# Patient Record
Sex: MALE | Race: WHITE | Employment: UNEMPLOYED
[De-identification: names, ages, dates, MRNs, and addresses within clinical notes are randomized per-mention and may not be internally consistent; named-entity substitution may affect disease eponyms.]

---

## 2020-08-21 ENCOUNTER — HOSPITAL ENCOUNTER (OUTPATIENT)
Age: 60
End: 2020-08-21
Attending: INTERNAL MEDICINE | Admitting: INTERNAL MEDICINE
Payer: COMMERCIAL

## 2020-08-21 ENCOUNTER — TRANSFERRED RECORDS (OUTPATIENT)
Dept: HEALTH INFORMATION MANAGEMENT | Facility: CLINIC | Age: 60
End: 2020-08-21

## 2020-08-21 NOTE — PROGRESS NOTES
[facility]  Transfer Triage Note    Date of call: 08/21/20  Time of call: 2:59 PM    Is pandemic COVID-19 a concern? NO    Reason for transfer: Procedure can be done here and not at referring hospital   Diagnosis: Pancreatic Pseudocyst    Outside Records: Available  Additional records requested to be faxed to [phone].    Stability of Patient: Patient is vitally stable, with no critical labs, and will likely remain stable throughout the transfer process  ICU: No    Expected Time of Arrival for Transfer: 8-24 hours    Arrival Location:  [address] [address] Phone: [phone]    Recommendations for Management and Stabilization: Not needed    Additional Comments   Refugio Rivera is a 59 year old male with a history of HTN was diagnosed with gall stone pancreatitis on 8/3 with subsequent cholecystectomy, re-admitted on 8/14 found to have pancreatic pseudocyst causing dyspepsia symptoms and DKA from new onset diabetes likely related to acute pancreatitis. His vitals are within normal limits and blood sugar is stable on sub cutaneous insulin. GI discussed this case and agreed that patient would likely need endoscopic intervention. Course has been complicated by adjustment disorder (started on fluoxetine) and DVT with bilateral pulmonary emboli thought to be from immobility and recent surgery. This was further complicated by bleeding gastric ulcer on lovenox, no s/p IVC filter placement. Will need enteric precautions given recent C-diff treatment completion.    John Atkins MD    Spoke with the resident, he is stable, no fever, no WBC, is not getting any feeding. Patient is on clear liquids and tolerating those ok.   Sugars are 140s. A1C 5.3%.  COVID test not done-will discuss timing with GI tomorrow.  Discussed on 8/24 with GI. Given tolerating clears would be safe to wait until schedule allows and patient able to have his  procedure covered.    Jane Nash MD    8/25  [place]  called and requested a call back by the patient's  here once assigned. Her name is Ayla. Phone is [phone].  Will touch base with team tomorrow morning (8/26) at latest.  Jane Nash MD    Addendum 8/26:  Patient doing well clinically. Our bed situation is quite tight, certainly not able to take today and remains withtou clear indication for more emergent intervention. They have been slowly advancing the diet, moving to full liquid today and trying to transition to oral pain medicines. Plan to touch base again tomorrow and will have conference call with GI, might be possible to consider outpatient follow-up w/in the next 1-2 weeks.     Yohannes Urias MD    Patient improving clinically and decided to go home and have the procedure as an outpatient here next week. Dr. Griffith's nurse Tameka arranging for procedure. Her office number is [phone]. Gave that number to the [facility] hospitalist who will pass it along to the patient in case he does not hear from her by Monday morning. They will also do a COVID swab before he leaves so he has one within 4 days of the procedure.    Jane Nash

## 2020-08-31 ENCOUNTER — PATIENT OUTREACH (OUTPATIENT)
Dept: GASTROENTEROLOGY | Facility: CLINIC | Age: 60
End: 2020-08-31

## 2020-08-31 NOTE — PROGRESS NOTES
Care Coordination New Patient Referral  [facility]    NP referral date: 08/28/20  Referred to MD: [facility]    Referring MD:   Referring contact information see initial referral note - none - this was direct call to Dr. Griffith   Referral for procedure    Diagnosis: necrotizing pancreatitis  Diabetes    Imaging:  Images available    CT  Location: [facility]  Date:  8/21/20    MRI   [facility]  Date:  7/28/20    Procedures:  Placement of IVC filter  Laparoscopic cholecystectomy 8//3/20    08/31/20 message left at home care to ask for assistance with planning procedure; attempted to reach patient, no answer.    Referral reviewed by Dr. Rodrigues; will be evaluated and managed by inpatient team.    Referral sent to [facility] for Dr. Griffith.      Meredith Lira  BSN, HNBC  RN Care Coordinator  [facility]  Ph: [phone]  Email: [email or web address]

## 2020-09-02 ENCOUNTER — PATIENT OUTREACH (OUTPATIENT)
Dept: GASTROENTEROLOGY | Facility: CLINIC | Age: 60
End: 2020-09-02

## 2020-09-03 ENCOUNTER — PATIENT OUTREACH (OUTPATIENT)
Dept: GASTROENTEROLOGY | Facility: CLINIC | Age: 60
End: 2020-09-03

## 2020-09-03 NOTE — TELEPHONE ENCOUNTER
Per intpatient team, pt should report to ER for triage of abd pain and work up for possible procedure, EUS. Pt not feeling well, understands plan, will come to [facility] today for further triage and care.    Tameka Julien, TYRONE Care Coordinator

## 2020-09-10 ENCOUNTER — PATIENT OUTREACH (OUTPATIENT)
Dept: GASTROENTEROLOGY | Facility: CLINIC | Age: 60
End: 2020-09-10

## 2020-09-10 NOTE — TELEPHONE ENCOUNTER
Lois called to inquire about patient f/up. Sounds like he's not following with clinic visits either.    Last we spoke to patient he committed to coming to [facility] for triage and assessment.     Left message for Lois of events leading to today, and that patient did not arrive at [facility] as agreed.     Called patient, left message asking that he call clinic to check in and tell us how he's doing.    Tameka Julien, RN Care Coordinator

## 2020-09-11 NOTE — TELEPHONE ENCOUNTER
"Received call from Dina at [facility] looking to assist in f/ up for patient. He has history of not following up, when asked why he didn't return phone calls he says \"he didn't feel  Up to it\". They're suggesting he's going to have his phone on today and will assist us in passing on messages for POC.     Pt was referred for EUS cystgastrostomy, and told to come to ER as he was not doing well, to assess, stabilize and provide procedure as needed. Pt was directed to come to ER, but did not on 9/4. ER remains our recommendation as time to procedure is several weeks out.     Left message for Mile ([phone]) of plan above. Called patient again, reviewed plan above, said would try to get to ER \"in the next few days\".    .Tameka Julien, RN Care Coordinator    "

## 2020-10-22 ENCOUNTER — TRANSFERRED RECORDS (OUTPATIENT)
Dept: HEALTH INFORMATION MANAGEMENT | Facility: CLINIC | Age: 60
End: 2020-10-22

## 2020-10-29 ENCOUNTER — TRANSCRIBE ORDERS (OUTPATIENT)
Dept: OTHER | Age: 60
End: 2020-10-29

## 2020-10-29 DIAGNOSIS — K86.3 PANCREATIC PSEUDOCYST: Primary | ICD-10-CM

## 2020-11-04 ENCOUNTER — DOCUMENTATION ONLY (OUTPATIENT)
Dept: CARE COORDINATION | Facility: CLINIC | Age: 60
End: 2020-11-04

## 2020-11-12 ENCOUNTER — HOSPITAL ENCOUNTER (OUTPATIENT)
Facility: CLINIC | Age: 60
End: 2020-11-12
Attending: INTERNAL MEDICINE | Admitting: INTERNAL MEDICINE
Payer: COMMERCIAL

## 2020-11-12 ENCOUNTER — PREP FOR PROCEDURE (OUTPATIENT)
Dept: GASTROENTEROLOGY | Facility: CLINIC | Age: 60
End: 2020-11-12

## 2020-11-12 ENCOUNTER — VIRTUAL VISIT (OUTPATIENT)
Dept: GASTROENTEROLOGY | Facility: CLINIC | Age: 60
End: 2020-11-12
Payer: COMMERCIAL

## 2020-11-12 VITALS — WEIGHT: 167 LBS | BODY MASS INDEX: 22.62 KG/M2 | HEIGHT: 72 IN

## 2020-11-12 DIAGNOSIS — K86.3 PANCREATIC PSEUDOCYST: Primary | ICD-10-CM

## 2020-11-12 DIAGNOSIS — K85.81 OTHER ACUTE PANCREATITIS WITH UNINFECTED NECROSIS: Primary | ICD-10-CM

## 2020-11-12 DIAGNOSIS — Z11.59 ENCOUNTER FOR SCREENING FOR OTHER VIRAL DISEASES: Primary | ICD-10-CM

## 2020-11-12 DIAGNOSIS — K86.3 PANCREATIC PSEUDOCYST: ICD-10-CM

## 2020-11-12 PROCEDURE — 99204 OFFICE O/P NEW MOD 45 MIN: CPT | Mod: GT | Performed by: INTERNAL MEDICINE

## 2020-11-12 RX ORDER — ALBUTEROL SULFATE 90 UG/1
2 AEROSOL, METERED RESPIRATORY (INHALATION)
COMMUNITY

## 2020-11-12 RX ORDER — BLOOD-GLUCOSE METER
EACH MISCELLANEOUS
COMMUNITY
Start: 2020-08-10 | End: 2021-08-10

## 2020-11-12 RX ORDER — BLOOD-GLUCOSE METER
KIT MISCELLANEOUS
COMMUNITY
Start: 2020-08-10 | End: 2021-08-10

## 2020-11-12 RX ORDER — AMLODIPINE BESYLATE 10 MG/1
10 TABLET ORAL
COMMUNITY
Start: 2020-10-12 | End: 2021-10-12

## 2020-11-12 RX ORDER — LISINOPRIL 40 MG/1
40 TABLET ORAL
COMMUNITY
Start: 2020-10-12 | End: 2021-10-12

## 2020-11-12 RX ORDER — PANTOPRAZOLE SODIUM 40 MG/1
40 TABLET, DELAYED RELEASE ORAL
COMMUNITY
Start: 2020-10-28 | End: 2021-10-28

## 2020-11-12 RX ORDER — FAMOTIDINE 20 MG/1
20 TABLET, FILM COATED ORAL
COMMUNITY
Start: 2020-10-28 | End: 2021-10-28

## 2020-11-12 RX ORDER — ATENOLOL 50 MG/1
50 TABLET ORAL
Status: ON HOLD | COMMUNITY
Start: 2020-08-29 | End: 2021-03-31

## 2020-11-12 ASSESSMENT — MIFFLIN-ST. JEOR: SCORE: 1610.51

## 2020-11-12 NOTE — PROGRESS NOTES
"Refugio Rivera is a 60 year old male who is being evaluated via a billable video visit.      The patient has been notified of following:     \"This video visit will be conducted via a call between you and your physician/provider. We have found that certain health care needs can be provided without the need for an in-person physical exam.  This service lets us provide the care you need with a video conversation.  If a prescription is necessary we can send it directly to your pharmacy.  If lab work is needed we can place an order for that and you can then stop by our lab to have the test done at a later time.    Video visits are billed at different rates depending on your insurance coverage.  Please reach out to your insurance provider with any questions.    If during the course of the call the physician/provider feels a video visit is not appropriate, you will not be charged for this service.\"    Patient has given verbal consent for Video visit? Yes  How would you like to obtain your AVS? Mail a copy  If you are dropped from the video visit, the video invite should be resent to: Text to cell phone: [phone]  Will anyone else be joining your video visit? No        Video-Visit Details    Type of service:  Video Visit        Guru Barbi MD      "

## 2020-11-12 NOTE — LETTER
"    11/12/2020         RE: Refugio Rivera  [address]  [address]  [place] [address]        Dear Colleague,    Thank you for referring your patient, Refugio Rivera, to the [facility]. Please see a copy of my visit note below.    Refugio Rivera is a 60 year old male who is being evaluated via a billable video visit.      The patient has been notified of following:     \"This video visit will be conducted via a call between you and your physician/provider. We have found that certain health care needs can be provided without the need for an in-person physical exam.  This service lets us provide the care you need with a video conversation.  If a prescription is necessary we can send it directly to your pharmacy.  If lab work is needed we can place an order for that and you can then stop by our lab to have the test done at a later time.    Video visits are billed at different rates depending on your insurance coverage.  Please reach out to your insurance provider with any questions.    If during the course of the call the physician/provider feels a video visit is not appropriate, you will not be charged for this service.\"    Patient has given verbal consent for Video visit? Yes  How would you like to obtain your AVS? Mail a copy  If you are dropped from the video visit, the video invite should be resent to: Text to cell phone: [phone]  Will anyone else be joining your video visit? No        Video-Visit Details    Type of service:  Video Visit        Guru Barbi MD        REFERRING PROVIDER:  Dr. Korina Batista.      CHIEF COMPLAINT:  Abdominal pain, failure to thrive, weight loss.      HISTORY OF PRESENT ILLNESS:  This is a 60-year-old white male with history of hypertension, hyperlipidemia, esophagitis with gastric ulcer who had been admitted at an outside hospital between 07/28/2020 and 08/30/2020 for acute pancreatitis, status post " cholecystectomy.  Hospital stay was complicated by aspiration pneumonia, diabetic ketoacidosis, C diff colitis.  The patient was referred here for followup for walled-off necrosis/pseudocyst. He was referred immediately after discharge, but this outpatient visit only materialized now. Currently, the patient reports intermittent abdominal pain, nausea and vomiting and he has lost about 40 pounds.  He has early satiety and is unable to eat because of the peripancreatic collection.  As part of GI workup, he has had an EGD, which showed esophagitis and a small gastric ulcer.  No obvious gastric masses were seen.  He has also had C. diff colitis for which he was managed on oral vancomycin 125 mg q.i.d.      PAST MEDICAL HISTORY:   1.  Polysubstance abuse.   2.  Alcohol abuse.   3.  Deep vein thrombosis, currently on Lovenox.   4.  Pulmonary embolism.   5.  Diabetic ketoacidosis.   6.  Hypertension.   7.  Alcohol abuse.   8.  Prolonged QT interval.   9.  Generalized anxiety disorder.   10.  Marijuana abuse.   11.  PTSD.   12.  Social anxiety disorder  13. C diff colitis  14. Depression  15 .Opiate abuse      PAST SURGICAL HISTORY:  Cholecystectomy with cholangiography on 08/03/2020. IVC filter placement in 8/2020     SOCIAL HISTORY:  Currently .  Former smoker, used to smoke 1-1/2 pack per day, quit smoking in 2004.  Smokes marijuana, drinks alcohol.      FAMILY HISTORY:  No history of pancreatitis.      ASSESSMENT AND PLAN:    This is a 60-year-old white male with history of acute pancreatitis for which he was hospitalized for nearly a month.  He underwent same admission cholecystectomy.  His hospital stay has been complicated by aspiration pneumonia, C. diff colitis and DKA.  He is being evaluated for a large walled-off necrosis/pseudocyst, last CT 10/2020.  Current symptoms include intermittent abdominal pain, weight loss of 40 pounds, persistent unwellness and failure to thrive.  The following are our  recommendations:   1.  Will recommend EUS-guided cystogastrostomy for endoscopic transluminal drainage.  This is performed under fluoroscopy.  We discussed in detail the risk of the procedure including risk of anesthesia, bleeding, risk of infection and perforation for which surgery would be required.   2.  The patient would need to hold off Lovenox the day before procedure.  The patient has an IVC filter.  He has recently had PEs and DVTs.  However, there is a risk of increased bleeding in the setting of Lovenox for EUS cystogastrostomy.   3.  He needs preop clearance, will need COVID testing prior to the procedure.   4.  Will need to check his HbA1C as a part of the preop labs.      A total of 25 minutes was spent, with more than 50% of the time discussing the risks of the procedure.     Sincerely,        Guru Barbi MD

## 2020-11-12 NOTE — PROGRESS NOTES
REFERRING PROVIDER:  Dr. Korina Batista.      CHIEF COMPLAINT:  Abdominal pain, failure to thrive, weight loss.      HISTORY OF PRESENT ILLNESS:  This is a 60-year-old white male with history of hypertension, hyperlipidemia, esophagitis with gastric ulcer who had been admitted at an outside hospital between 07/28/2020 and 08/30/2020 for acute pancreatitis, status post cholecystectomy.  Hospital stay was complicated by aspiration pneumonia, diabetic ketoacidosis, C diff colitis.  The patient was referred here for followup for walled-off necrosis/pseudocyst. He was referred immediately after discharge, but this outpatient visit only materialized now. Currently, the patient reports intermittent abdominal pain, nausea and vomiting and he has lost about 40 pounds.  He has early satiety and is unable to eat because of the peripancreatic collection.  As part of GI workup, he has had an EGD, which showed esophagitis and a small gastric ulcer.  No obvious gastric masses were seen.  He has also had C. diff colitis for which he was managed on oral vancomycin 125 mg q.i.d.      PAST MEDICAL HISTORY:   1.  Polysubstance abuse.   2.  Alcohol abuse.   3.  Deep vein thrombosis, currently on Lovenox.   4.  Pulmonary embolism.   5.  Diabetic ketoacidosis.   6.  Hypertension.   7.  Alcohol abuse.   8.  Prolonged QT interval.   9.  Generalized anxiety disorder.   10.  Marijuana abuse.   11.  PTSD.   12.  Social anxiety disorder  13. C diff colitis  14. Depression  15 .Opiate abuse      PAST SURGICAL HISTORY:  Cholecystectomy with cholangiography on 08/03/2020. IVC filter placement in 8/2020     SOCIAL HISTORY:  Currently .  Former smoker, used to smoke 1-1/2 pack per day, quit smoking in 2004.  Smokes marijuana, drinks alcohol.      FAMILY HISTORY:  No history of pancreatitis.      ASSESSMENT AND PLAN:    This is a 60-year-old white male with history of acute pancreatitis for which he was hospitalized for nearly a month.   He underwent same admission cholecystectomy.  His hospital stay has been complicated by aspiration pneumonia, C. diff colitis and DKA.  He is being evaluated for a large walled-off necrosis/pseudocyst, last CT 10/2020.  Current symptoms include intermittent abdominal pain, weight loss of 40 pounds, persistent unwellness and failure to thrive.  The following are our recommendations:   1.  Will recommend EUS-guided cystogastrostomy for endoscopic transluminal drainage.  This is performed under fluoroscopy.  We discussed in detail the risk of the procedure including risk of anesthesia, bleeding, risk of infection and perforation for which surgery would be required.   2.  The patient would need to hold off Lovenox the day before procedure.  The patient has an IVC filter.  He has recently had PEs and DVTs.  However, there is a risk of increased bleeding in the setting of Lovenox for EUS cystogastrostomy.   3.  He needs preop clearance, will need COVID testing prior to the procedure.   4.  Will need to check his HbA1C as a part of the preop labs.      A total of 25 minutes was spent, with more than 50% of the time discussing the risks of the procedure.

## 2020-11-13 ENCOUNTER — PATIENT OUTREACH (OUTPATIENT)
Dept: GASTROENTEROLOGY | Facility: CLINIC | Age: 60
End: 2020-11-13

## 2020-11-13 RX ORDER — LIDOCAINE 40 MG/G
CREAM TOPICAL
Status: CANCELLED | OUTPATIENT
Start: 2020-11-13

## 2020-11-13 NOTE — TELEPHONE ENCOUNTER
Per Dr. Landon   He needs preop clearance, will need COVID testing prior to the procedure.     Patient explains that transportation could be a problem, Currently scheduled for procedure on 11/18. Reviewed need for preop and covid test.  Will call patient back Monday to discuss further.    ML

## 2020-11-16 NOTE — TELEPHONE ENCOUNTER
Pt cannot come down for procedure    Is on lovenox now for DVT, advised has to hold 24 hours prior to procedure. Knows he needs preop and covid test prior. Unsure when he'll have a ride for procedure. Pt will call schedule when he's ready to reschedule.     Tameka Julien RN Care Coordinator

## 2020-11-16 NOTE — OR NURSING
Tameka Julien RN Callinan, Mary K, RN; Guru Markie Landon MD; P Pas ; Mikayla Joseph all-     PT cannot come as scheduled and we will be canceling procedure for 11/18     Mikayla, he will call you when he wants to reschedule     Thanks-

## 2020-12-01 ENCOUNTER — PATIENT OUTREACH (OUTPATIENT)
Dept: GASTROENTEROLOGY | Facility: CLINIC | Age: 60
End: 2020-12-01

## 2020-12-01 NOTE — LETTER
December 1, 2020    Refugio GORDON Clothier                              [address] [facility]    [place] [address]    Dear Refugio,    You recently met with Dr. Landon and discussed scheduling an outpatient procedure. It appears this has not been scheduled yet.    If you would like to schedule this outpatient procedure, please contact our scheduling office at [phone]. Please remember that you will need a preop physical within 30 days of your procedure AND a covid test no more than 4 day before procedure date.     Your healthcare is important to us. Please call us with any questions or concerns.     Tameka Julien, TYRONE Care Coordinator  Dr. Carroll, Dr. Griffith & Dr. Landon  [facility]  [phone]

## 2020-12-01 NOTE — TELEPHONE ENCOUNTER
Per Dr. Landon called patient to see if he will plan to reschedule recommended procedure. Left message. Letter sent.     ML

## 2021-01-10 ENCOUNTER — HEALTH MAINTENANCE LETTER (OUTPATIENT)
Age: 61
End: 2021-01-10

## 2021-01-19 ENCOUNTER — TRANSFERRED RECORDS (OUTPATIENT)
Dept: HEALTH INFORMATION MANAGEMENT | Facility: CLINIC | Age: 61
End: 2021-01-19

## 2021-01-19 ENCOUNTER — MEDICAL CORRESPONDENCE (OUTPATIENT)
Dept: HEALTH INFORMATION MANAGEMENT | Facility: CLINIC | Age: 61
End: 2021-01-19

## 2021-01-28 ENCOUNTER — PATIENT OUTREACH (OUTPATIENT)
Dept: GASTROENTEROLOGY | Facility: CLINIC | Age: 61
End: 2021-01-28

## 2021-01-28 NOTE — LETTER
January 28, 2021    Refugio GORDON Clothier                              [address]     [place] [address]    Dear Refugio,      Our office recently received a referral from Dr. Batista related to your healthcare. You met with Dr. Landon in November 2020 related to your for walled-off pancraetic necrosis/pseudocyst    We've talked several times about coming to our facility for a procedure, but you have not scheduled a date or any additional follow up.  If you wish to follow up on this referral please contact our office at [phone].     Tameka Julien RN Care Coordinator

## 2021-01-28 NOTE — TELEPHONE ENCOUNTER
[facility] received referral for patient related to panc psyeudocyst.     Korina Batista MD - 01/19/2021 8:45 AM CST      Diabetes with history of pancreatitis and pancreatic pseudocyst  Last hemoglobin A1c was 6.5% on 10/12. Patient is required multiple hospitalizations in the past for DKA. Previously had poor control at home and was not using his insulin, was transitioned to Metformin in October 2020. Patient was meant to transfer to the [facility] for drainage of his pseudocyst last summer, however he decided not to, and was lost for follow-up to GI. Continues to have some dyspepsia related to this.  -Have placed GI referral for follow-up for pancreatic pseudocyst  -Will obtain hemoglobin A1c today  -Continue Metformin 1000 mg twice daily at this time    Patient has been offered procedure twice, related to 2 separate previous referrals,  and has failed to arrange a date for procedure. We've organized virtual clinic visit and made multiple attempts for patient to come for procedure, but he has not followed up.     Called patient again to see if he is interested in follow up with us at this time. Rides have been a barrier to treatment previously.     Left message, letter sent.    ML

## 2021-03-01 ENCOUNTER — PATIENT OUTREACH (OUTPATIENT)
Dept: GASTROENTEROLOGY | Facility: CLINIC | Age: 61
End: 2021-03-01

## 2021-03-01 DIAGNOSIS — K86.3 PANCREATIC PSEUDOCYST: Primary | ICD-10-CM

## 2021-03-01 NOTE — TELEPHONE ENCOUNTER
Pt called stating he wants to schedule a time to come for procedure for EUS w/ . Previous attempts have been made to get patient to procedure but he has not followed up. Records updated via [facility]. Last imaging on file October, 2020. Per patient he's tired of being  Sick having trouble eating, trouble going to the bathroom. No fevers.    Will check with Dr. Landon regarding follow up.    ML

## 2021-03-02 NOTE — TELEPHONE ENCOUNTER
Per Dr. Landon        He was recommended to have EUS guided cystgastrostomy. Will need CT scan with IV contrast of abdomen and pelvis      Called to review above with patient.     Pt wants to do imaging locally. Orders placed. Will fax to [facility]. Reviewed with patient that he may need to come to [facility] for several procedure or follow up imaging, that he's required to have a  and chaperone for 24 hours post procedure and that we recommend staying 45 minutes from hospital post procedure for 24 hours.     Will fax images.  ML

## 2021-03-03 ENCOUNTER — DOCUMENTATION ONLY (OUTPATIENT)
Dept: GASTROENTEROLOGY | Facility: CLINIC | Age: 61
End: 2021-03-03

## 2021-03-12 ENCOUNTER — PATIENT OUTREACH (OUTPATIENT)
Dept: GASTROENTEROLOGY | Facility: CLINIC | Age: 61
End: 2021-03-12

## 2021-03-12 ENCOUNTER — PREP FOR PROCEDURE (OUTPATIENT)
Dept: GASTROENTEROLOGY | Facility: CLINIC | Age: 61
End: 2021-03-12

## 2021-03-12 DIAGNOSIS — K86.89 FLUID COLLECTION OF PANCREAS: ICD-10-CM

## 2021-03-12 DIAGNOSIS — K85.91 NECROTIZING PANCREATITIS: Primary | ICD-10-CM

## 2021-03-12 NOTE — TELEPHONE ENCOUNTER
As discussed, patient got CT locally to eval pancreatic fluid collection. Per Dr. Landon    Please call patient. If he continues to be in pain and is committed, I am willing to offer EUS under fluroscopy for endoscopic transluminal drainage     Called to discuss with patient, discussed procedure. Stressed patient needs to make sure he can come for procedure before we schedule  Date, reviewed need for preop and covid test, ride and 24 hour guardian after procedure.    Orders placed, he will call back with date that works for him/his ride.     Please assist in scheduling:     Procedure/Imaging/Clinic: EUS  Physician: Dr. Landon  Timing: 3/31/21  Procedure length:60 min  Anesthesia:general  Dx: panc fluid collection  Tier:2  Location: [facility]       Called to discuss with patient. Explained they can expect a call for date and time for procedure, will need a , someone to stay with them for 24 hours and should stay in town for 24 hours (within 45 min of Hospital) post procedure    Patient needs to get pre-op physical completed and will fax a copy to us along with bringing a hard copy with them. Fax number given. [phone] *If you do not get a preop physical, your procedure could be cancelled, patient voiced understanding*    Preop Plan:will do locally     Med Review    Blood thinner -  no  ASA - no  Diabetic - no    COVID test discussed:knows to get 3-4 days prior    Patient Education r/t procedure:done    Does patient have any history of gastric bypass/gastric surgery/altered panc/bili anatomy?no    A pre-op nurse will call 1-2 days prior to the procedure. Is advised to be NPO/no solid food 8 hours before the procedure. Ok to drink clear liquids (Water, Apple Juice or Gatorade) up to 2 hours prior to procedure.     Other specific details/comments:no     Verbalized understanding of all instructions. All questions answered.

## 2021-03-15 PROBLEM — K86.89 FLUID COLLECTION OF PANCREAS: Status: ACTIVE | Noted: 2021-03-15

## 2021-03-24 ENCOUNTER — PATIENT OUTREACH (OUTPATIENT)
Dept: GASTROENTEROLOGY | Facility: CLINIC | Age: 61
End: 2021-03-24

## 2021-03-24 NOTE — TELEPHONE ENCOUNTER
Per Refugio, need orders to sent to [facility]. Will fax to   [address] [address] [phone] ml

## 2021-03-25 ENCOUNTER — PATIENT OUTREACH (OUTPATIENT)
Dept: GASTROENTEROLOGY | Facility: CLINIC | Age: 61
End: 2021-03-25

## 2021-03-25 ENCOUNTER — DOCUMENTATION ONLY (OUTPATIENT)
Dept: GASTROENTEROLOGY | Facility: CLINIC | Age: 61
End: 2021-03-25

## 2021-03-25 ENCOUNTER — TRANSFERRED RECORDS (OUTPATIENT)
Dept: HEALTH INFORMATION MANAGEMENT | Facility: CLINIC | Age: 61
End: 2021-03-25

## 2021-03-25 LAB
CREAT SERPL-MCNC: 1.36 MG/DL (ref 0.72–1.25)
GFR SERPL CREATININE-BSD FRML MDRD: 57 ML/MIN/1.73M2
GLUCOSE SERPL-MCNC: 236 MG/DL (ref 70–100)
POTASSIUM SERPL-SCNC: 4.3 MMOL/L (ref 3.5–5.1)

## 2021-03-25 NOTE — TELEPHONE ENCOUNTER
Per PAN, no H&P done yet in advance of procedure with Dr. Landon on 3/31    Left message.      Tameka Julien, RN Care Coordinator    
[facility]

## 2021-03-25 NOTE — PROGRESS NOTES
Order for COVID test faxed to Pt's PCP at     [place] [id number]   Phone: [phone]  Fax: [phone]    Jerry Quiñones LPN

## 2021-03-30 ENCOUNTER — ANESTHESIA EVENT (OUTPATIENT)
Dept: SURGERY | Facility: CLINIC | Age: 61
End: 2021-03-30
Payer: COMMERCIAL

## 2021-03-30 SDOH — HEALTH STABILITY: MENTAL HEALTH: HOW OFTEN DO YOU HAVE A DRINK CONTAINING ALCOHOL?: NEVER

## 2021-03-31 ENCOUNTER — APPOINTMENT (OUTPATIENT)
Dept: GENERAL RADIOLOGY | Facility: CLINIC | Age: 61
End: 2021-03-31
Attending: INTERNAL MEDICINE
Payer: COMMERCIAL

## 2021-03-31 ENCOUNTER — ANESTHESIA (OUTPATIENT)
Dept: SURGERY | Facility: CLINIC | Age: 61
End: 2021-03-31
Payer: COMMERCIAL

## 2021-03-31 ENCOUNTER — HOSPITAL ENCOUNTER (OUTPATIENT)
Facility: CLINIC | Age: 61
Discharge: HOME | End: 2021-03-31
Attending: INTERNAL MEDICINE | Admitting: INTERNAL MEDICINE
Payer: COMMERCIAL

## 2021-03-31 VITALS
WEIGHT: 141.31 LBS | HEIGHT: 72 IN | SYSTOLIC BLOOD PRESSURE: 142 MMHG | OXYGEN SATURATION: 100 % | DIASTOLIC BLOOD PRESSURE: 94 MMHG | HEART RATE: 94 BPM | BODY MASS INDEX: 19.14 KG/M2 | RESPIRATION RATE: 16 BRPM | TEMPERATURE: 96.9 F

## 2021-03-31 DIAGNOSIS — K86.89 FLUID COLLECTION OF PANCREAS: ICD-10-CM

## 2021-03-31 DIAGNOSIS — Z98.890 S/P FINE NEEDLE ASPIRATION: Primary | ICD-10-CM

## 2021-03-31 LAB
GLUCOSE BLDC GLUCOMTR-MCNC: 215 MG/DL (ref 70–99)
GLUCOSE BLDC GLUCOMTR-MCNC: 235 MG/DL (ref 70–99)
INR PPP: 1.14 (ref 0.86–1.14)
UPPER EUS: NORMAL

## 2021-03-31 PROCEDURE — 999N000141 HC STATISTIC PRE-PROCEDURE NURSING ASSESSMENT: Performed by: INTERNAL MEDICINE

## 2021-03-31 PROCEDURE — 360N000075 HC SURGERY LEVEL 2, PER MIN: Performed by: INTERNAL MEDICINE

## 2021-03-31 PROCEDURE — 710N000010 HC RECOVERY PHASE 1, LEVEL 2, PER MIN: Performed by: INTERNAL MEDICINE

## 2021-03-31 PROCEDURE — P9041 ALBUMIN (HUMAN),5%, 50ML: HCPCS | Performed by: NURSE ANESTHETIST, CERTIFIED REGISTERED

## 2021-03-31 PROCEDURE — 370N000017 HC ANESTHESIA TECHNICAL FEE, PER MIN: Performed by: INTERNAL MEDICINE

## 2021-03-31 PROCEDURE — 999N000179 XR SURGERY CARM FLUORO LESS THAN 5 MIN W STILLS: Mod: TC

## 2021-03-31 PROCEDURE — 250N000009 HC RX 250: Performed by: NURSE ANESTHETIST, CERTIFIED REGISTERED

## 2021-03-31 PROCEDURE — C1876 STENT, NON-COA/NON-COV W/DEL: HCPCS | Performed by: INTERNAL MEDICINE

## 2021-03-31 PROCEDURE — 85610 PROTHROMBIN TIME: CPT | Performed by: STUDENT IN AN ORGANIZED HEALTH CARE EDUCATION/TRAINING PROGRAM

## 2021-03-31 PROCEDURE — 250N000011 HC RX IP 250 OP 636: Performed by: ANESTHESIOLOGY

## 2021-03-31 PROCEDURE — 710N000012 HC RECOVERY PHASE 2, PER MINUTE: Performed by: INTERNAL MEDICINE

## 2021-03-31 PROCEDURE — C1769 GUIDE WIRE: HCPCS | Performed by: INTERNAL MEDICINE

## 2021-03-31 PROCEDURE — 250N000011 HC RX IP 250 OP 636: Performed by: NURSE ANESTHETIST, CERTIFIED REGISTERED

## 2021-03-31 PROCEDURE — 250N000009 HC RX 250: Performed by: INTERNAL MEDICINE

## 2021-03-31 PROCEDURE — 255N000002 HC RX 255 OP 636: Performed by: INTERNAL MEDICINE

## 2021-03-31 PROCEDURE — 272N000001 HC OR GENERAL SUPPLY STERILE: Performed by: INTERNAL MEDICINE

## 2021-03-31 PROCEDURE — 36415 COLL VENOUS BLD VENIPUNCTURE: CPT | Performed by: STUDENT IN AN ORGANIZED HEALTH CARE EDUCATION/TRAINING PROGRAM

## 2021-03-31 PROCEDURE — C1726 CATH, BAL DIL, NON-VASCULAR: HCPCS | Performed by: INTERNAL MEDICINE

## 2021-03-31 PROCEDURE — 250N000025 HC SEVOFLURANE, PER MIN: Performed by: INTERNAL MEDICINE

## 2021-03-31 PROCEDURE — 258N000003 HC RX IP 258 OP 636: Performed by: NURSE ANESTHETIST, CERTIFIED REGISTERED

## 2021-03-31 PROCEDURE — 82962 GLUCOSE BLOOD TEST: CPT

## 2021-03-31 DEVICE — STENT AND ELECTROCAUTERY - ENHANCED DELIVERY SYSTEM
Type: IMPLANTABLE DEVICE | Site: STOMACH | Status: NON-FUNCTIONAL
Brand: AXIOS™
Removed: 2021-04-12

## 2021-03-31 DEVICE — STENT SOLUS BILIARY 10FRX03CM DBL PIGTAIL W/INTRO G25670
Type: IMPLANTABLE DEVICE | Site: STOMACH | Status: NON-FUNCTIONAL
Removed: 2021-04-12

## 2021-03-31 RX ORDER — NALOXONE HYDROCHLORIDE 0.4 MG/ML
0.4 INJECTION, SOLUTION INTRAMUSCULAR; INTRAVENOUS; SUBCUTANEOUS
Status: DISCONTINUED | OUTPATIENT
Start: 2021-03-31 | End: 2021-03-31 | Stop reason: HOSPADM

## 2021-03-31 RX ORDER — LEVOFLOXACIN 5 MG/ML
INJECTION, SOLUTION INTRAVENOUS PRN
Status: DISCONTINUED | OUTPATIENT
Start: 2021-03-31 | End: 2021-03-31

## 2021-03-31 RX ORDER — IOPAMIDOL 510 MG/ML
INJECTION, SOLUTION INTRAVASCULAR PRN
Status: DISCONTINUED | OUTPATIENT
Start: 2021-03-31 | End: 2021-03-31 | Stop reason: HOSPADM

## 2021-03-31 RX ORDER — ACETAMINOPHEN 325 MG/1
975 TABLET ORAL ONCE
Status: DISCONTINUED | OUTPATIENT
Start: 2021-03-31 | End: 2021-03-31 | Stop reason: HOSPADM

## 2021-03-31 RX ORDER — NALOXONE HYDROCHLORIDE 0.4 MG/ML
0.2 INJECTION, SOLUTION INTRAMUSCULAR; INTRAVENOUS; SUBCUTANEOUS
Status: DISCONTINUED | OUTPATIENT
Start: 2021-03-31 | End: 2021-03-31 | Stop reason: HOSPADM

## 2021-03-31 RX ORDER — OXYCODONE HYDROCHLORIDE 5 MG/1
5 TABLET ORAL EVERY 4 HOURS PRN
Status: DISCONTINUED | OUTPATIENT
Start: 2021-03-31 | End: 2021-03-31 | Stop reason: HOSPADM

## 2021-03-31 RX ORDER — FLUMAZENIL 0.1 MG/ML
0.2 INJECTION, SOLUTION INTRAVENOUS
Status: DISCONTINUED | OUTPATIENT
Start: 2021-03-31 | End: 2021-03-31 | Stop reason: HOSPADM

## 2021-03-31 RX ORDER — LIDOCAINE 40 MG/G
CREAM TOPICAL
Status: DISCONTINUED | OUTPATIENT
Start: 2021-03-31 | End: 2021-03-31 | Stop reason: HOSPADM

## 2021-03-31 RX ORDER — FENTANYL CITRATE 50 UG/ML
25-50 INJECTION, SOLUTION INTRAMUSCULAR; INTRAVENOUS
Status: DISCONTINUED | OUTPATIENT
Start: 2021-03-31 | End: 2021-03-31 | Stop reason: HOSPADM

## 2021-03-31 RX ORDER — HYDROMORPHONE HYDROCHLORIDE 1 MG/ML
.3-.5 INJECTION, SOLUTION INTRAMUSCULAR; INTRAVENOUS; SUBCUTANEOUS EVERY 10 MIN PRN
Status: DISCONTINUED | OUTPATIENT
Start: 2021-03-31 | End: 2021-03-31 | Stop reason: HOSPADM

## 2021-03-31 RX ORDER — ONDANSETRON 2 MG/ML
4 INJECTION INTRAMUSCULAR; INTRAVENOUS EVERY 30 MIN PRN
Status: DISCONTINUED | OUTPATIENT
Start: 2021-03-31 | End: 2021-03-31 | Stop reason: HOSPADM

## 2021-03-31 RX ORDER — FENTANYL CITRATE 50 UG/ML
25-50 INJECTION, SOLUTION INTRAMUSCULAR; INTRAVENOUS EVERY 5 MIN PRN
Status: DISCONTINUED | OUTPATIENT
Start: 2021-03-31 | End: 2021-03-31 | Stop reason: HOSPADM

## 2021-03-31 RX ORDER — ALBUTEROL SULFATE 0.83 MG/ML
2.5 SOLUTION RESPIRATORY (INHALATION) EVERY 4 HOURS PRN
Status: DISCONTINUED | OUTPATIENT
Start: 2021-03-31 | End: 2021-03-31 | Stop reason: HOSPADM

## 2021-03-31 RX ORDER — EPHEDRINE SULFATE 50 MG/ML
INJECTION, SOLUTION INTRAMUSCULAR; INTRAVENOUS; SUBCUTANEOUS PRN
Status: DISCONTINUED | OUTPATIENT
Start: 2021-03-31 | End: 2021-03-31

## 2021-03-31 RX ORDER — ALBUMIN, HUMAN INJ 5% 5 %
SOLUTION INTRAVENOUS CONTINUOUS PRN
Status: DISCONTINUED | OUTPATIENT
Start: 2021-03-31 | End: 2021-03-31

## 2021-03-31 RX ORDER — LIDOCAINE HYDROCHLORIDE 20 MG/ML
INJECTION, SOLUTION INFILTRATION; PERINEURAL PRN
Status: DISCONTINUED | OUTPATIENT
Start: 2021-03-31 | End: 2021-03-31

## 2021-03-31 RX ORDER — SODIUM CHLORIDE, SODIUM LACTATE, POTASSIUM CHLORIDE, CALCIUM CHLORIDE 600; 310; 30; 20 MG/100ML; MG/100ML; MG/100ML; MG/100ML
INJECTION, SOLUTION INTRAVENOUS CONTINUOUS
Status: DISCONTINUED | OUTPATIENT
Start: 2021-03-31 | End: 2021-03-31 | Stop reason: HOSPADM

## 2021-03-31 RX ORDER — ONDANSETRON 4 MG/1
4 TABLET, ORALLY DISINTEGRATING ORAL EVERY 30 MIN PRN
Status: DISCONTINUED | OUTPATIENT
Start: 2021-03-31 | End: 2021-03-31 | Stop reason: HOSPADM

## 2021-03-31 RX ORDER — FENTANYL CITRATE 50 UG/ML
INJECTION, SOLUTION INTRAMUSCULAR; INTRAVENOUS PRN
Status: DISCONTINUED | OUTPATIENT
Start: 2021-03-31 | End: 2021-03-31

## 2021-03-31 RX ORDER — PROPOFOL 10 MG/ML
INJECTION, EMULSION INTRAVENOUS PRN
Status: DISCONTINUED | OUTPATIENT
Start: 2021-03-31 | End: 2021-03-31

## 2021-03-31 RX ORDER — SODIUM CHLORIDE, SODIUM LACTATE, POTASSIUM CHLORIDE, CALCIUM CHLORIDE 600; 310; 30; 20 MG/100ML; MG/100ML; MG/100ML; MG/100ML
INJECTION, SOLUTION INTRAVENOUS CONTINUOUS PRN
Status: DISCONTINUED | OUTPATIENT
Start: 2021-03-31 | End: 2021-03-31

## 2021-03-31 RX ORDER — ONDANSETRON 2 MG/ML
INJECTION INTRAMUSCULAR; INTRAVENOUS PRN
Status: DISCONTINUED | OUTPATIENT
Start: 2021-03-31 | End: 2021-03-31

## 2021-03-31 RX ORDER — MEPERIDINE HYDROCHLORIDE 25 MG/ML
12.5 INJECTION INTRAMUSCULAR; INTRAVENOUS; SUBCUTANEOUS
Status: DISCONTINUED | OUTPATIENT
Start: 2021-03-31 | End: 2021-03-31 | Stop reason: HOSPADM

## 2021-03-31 RX ORDER — LEVOFLOXACIN 500 MG/1
500 TABLET, FILM COATED ORAL DAILY
Qty: 7 TABLET | Refills: 0 | Status: SHIPPED | OUTPATIENT
Start: 2021-03-31

## 2021-03-31 RX ADMIN — SODIUM CHLORIDE, POTASSIUM CHLORIDE, SODIUM LACTATE AND CALCIUM CHLORIDE: 600; 310; 30; 20 INJECTION, SOLUTION INTRAVENOUS at 07:36

## 2021-03-31 RX ADMIN — Medication 10 MG: at 08:33

## 2021-03-31 RX ADMIN — PHENYLEPHRINE HYDROCHLORIDE 100 MCG: 10 INJECTION INTRAVENOUS at 08:09

## 2021-03-31 RX ADMIN — Medication 5 MG: at 07:54

## 2021-03-31 RX ADMIN — PHENYLEPHRINE HYDROCHLORIDE 100 MCG: 10 INJECTION INTRAVENOUS at 07:48

## 2021-03-31 RX ADMIN — PHENYLEPHRINE HYDROCHLORIDE 100 MCG: 10 INJECTION INTRAVENOUS at 08:27

## 2021-03-31 RX ADMIN — LIDOCAINE HYDROCHLORIDE 100 MG: 20 INJECTION, SOLUTION INFILTRATION; PERINEURAL at 07:46

## 2021-03-31 RX ADMIN — ONDANSETRON 4 MG: 2 INJECTION INTRAMUSCULAR; INTRAVENOUS at 08:35

## 2021-03-31 RX ADMIN — PROPOFOL 200 MG: 10 INJECTION, EMULSION INTRAVENOUS at 07:46

## 2021-03-31 RX ADMIN — LEVOFLOXACIN 500 MG: 5 INJECTION, SOLUTION INTRAVENOUS at 07:40

## 2021-03-31 RX ADMIN — PHENYLEPHRINE HYDROCHLORIDE 100 MCG: 10 INJECTION INTRAVENOUS at 07:51

## 2021-03-31 RX ADMIN — ALBUMIN (HUMAN): 12.5 SOLUTION INTRAVENOUS at 08:01

## 2021-03-31 RX ADMIN — SUGAMMADEX 200 MG: 100 INJECTION, SOLUTION INTRAVENOUS at 08:35

## 2021-03-31 RX ADMIN — Medication 5 MG: at 07:58

## 2021-03-31 RX ADMIN — MIDAZOLAM 2 MG: 1 INJECTION INTRAMUSCULAR; INTRAVENOUS at 07:28

## 2021-03-31 RX ADMIN — ALBUMIN (HUMAN): 12.5 SOLUTION INTRAVENOUS at 08:45

## 2021-03-31 RX ADMIN — PHENYLEPHRINE HYDROCHLORIDE 100 MCG: 10 INJECTION INTRAVENOUS at 08:30

## 2021-03-31 RX ADMIN — ROCURONIUM BROMIDE 50 MG: 10 INJECTION INTRAVENOUS at 07:46

## 2021-03-31 RX ADMIN — FENTANYL CITRATE 100 MCG: 50 INJECTION, SOLUTION INTRAMUSCULAR; INTRAVENOUS at 07:40

## 2021-03-31 RX ADMIN — ONDANSETRON 4 MG: 2 INJECTION INTRAMUSCULAR; INTRAVENOUS at 09:49

## 2021-03-31 ASSESSMENT — MIFFLIN-ST. JEOR: SCORE: 1494

## 2021-03-31 NOTE — ANESTHESIA PREPROCEDURE EVALUATION
Anesthesia Pre-Procedure Evaluation    Patient: Refugio Rivera   MRN: [id number] : 1960        Preoperative Diagnosis: Fluid collection of pancreas [K86.89]   Procedure : Procedure(s):  ENDOSCOPIC ULTRASOUND, ESOPHAGOSCOPY / UPPER GASTROINTESTINAL TRACT (GI)     Past Medical History:   Diagnosis Date     C. difficile colitis      Diabetes (H)      DVT (deep venous thrombosis) (H)      Gastroesophageal reflux disease      History of blood transfusion          Hypertension      Pancreatic disease     large pseudocyst     Pulmonary embolism (H)       Past Surgical History:   Procedure Laterality Date     ABDOMEN SURGERY      IVC filter 20 [facility]     CHOLECYSTECTOMY        No Known Allergies   Social History     Tobacco Use     Smoking status: Former Smoker     Quit date:      Years since quittin.2     Smokeless tobacco: Never Used   Substance Use Topics     Alcohol use: Never     Frequency: Never      Wt Readings from Last 1 Encounters:   21 64.1 kg (141 lb 5 oz)        Anesthesia Evaluation            ROS/MED HX  ENT/Pulmonary:       Neurologic:       Cardiovascular:     (+) hypertension----- (-) murmur and wheezes   METS/Exercise Tolerance:     Hematologic:       Musculoskeletal:       GI/Hepatic:     (+) GERD,     Renal/Genitourinary:       Endo:     (+) type II DM,     Psychiatric/Substance Use:       Infectious Disease:       Malignancy:       Other:            Physical Exam    Airway        Mallampati: II   TM distance: > 3 FB   Neck ROM: full   Mouth opening: > 3 cm    Respiratory Devices and Support         Dental  no notable dental history         Cardiovascular          Rhythm and rate: regular and normal (-) no systolic click and no murmur    Pulmonary   pulmonary exam normal        breath sounds clear to auscultation   (-) no wheezes        OUTSIDE LABS:  CBC: No results found for: WBC, HGB, HCT, PLT  BMP: No results found for: NA, POTASSIUM, CHLORIDE, CO2, BUN, CR,  GLC  COAGS:   Lab Results   Component Value Date    INR 1.14 03/31/2021     POC:   Lab Results   Component Value Date     (H) 03/31/2021     HEPATIC: No results found for: ALBUMIN, PROTTOTAL, ALT, AST, GGT, ALKPHOS, BILITOTAL, BILIDIRECT, MAXIMO  OTHER: No results found for: PH, LACT, A1C, YASH, PHOS, MAG, LIPASE, AMYLASE, TSH, T4, T3, CRP, SED    Anesthesia Plan    ASA Status:  3   NPO Status:  NPO Appropriate    Anesthesia Type: General.     - Airway: ETT   Induction: Intravenous.   Maintenance: Inhalation.        Consents    Anesthesia Plan(s) and associated risks, benefits, and realistic alternatives discussed. Questions answered and patient/representative(s) expressed understanding.     - Discussed with:  Patient      - Extended Intubation/Ventilatory Support Discussed: Yes.      - Patient is DNR/DNI Status: No    Use of blood products discussed: Yes.     - Discussed with: Patient.     Postoperative Care    Pain management: IV analgesics.   PONV prophylaxis: Ondansetron (or other 5HT-3), Dexamethasone or Solumedrol     Comments:                Christopher J. Behrens, MD

## 2021-03-31 NOTE — DISCHARGE INSTRUCTIONS
[facility], [place]  Same-Day Surgery ERCP Procedure   Adult Discharge Orders & Instructions     You had a procedure known as an Endoscopic Retrograde Cholangiopancreatography (ERCP). Your healthcare provider performed the ERCP to look at your bile or pancreatic ducts, and to locate and/or treat blockages (dilation, stenting, removal, etc.) in these ducts. Often biopsies, small samples of tissue, are taken to help diagnose and/or classify stages of disease growth. This procedure is used to diagnose diseases of the pancreas, bile ducts, pancreatic duct, liver, and gallbladder.     After your procedure   1. Make sure to clarify with your healthcare provider any diet restrictions (For example, clear liquid, low fat, no caffeine, etc.)   2. Do NOT take aspirin containing medications or any other blood-thinning medicines (anticoagulants) until your healthcare provider says it's OK.   3. You MAY be prescribed antibiotics, depending on what was done and/or found during your EUS, make sure to take antibiotics as prescribed by your healthcare provider    For 24 hours after surgery  1. Get plenty of rest.  A responsible adult must stay with you for at least 24 hours after you leave the hospital.   2. Do not drive or use heavy equipment.  If you have weakness or tingling, don't drive or use heavy equipment until this feeling goes away.  3. Do not drink alcohol.  4. Avoid strenuous or risky activities (gym, yoga, cycling, etc.).  Ask for help when climbing stairs.   5. You may feel lightheaded.  IF so, sit for a few minutes before standing.  Have someone help you get up.   6. If you have nausea (feel sick to your stomach): Drink only clear liquids such as apple juice, ginger ale, broth or 7-Up.  Rest may also help.  Be sure to drink enough fluids.  Move to a regular diet as you feel able.  7. If you feel bloated or have too much gas, use a heating pad on your belly to help reduce the discomfort.  This should help you feel better.   8. You may have a slight fever. This is normal for the first 24 hours.   9. You may have a dry mouth, a sore throat, muscle aches or trouble sleeping.  These are normal and will go away after 24 hours. A sore throat is most common. Use lozenges or gargle with salt water to ease the discomfort.   10. Do not make important or legal decisions.      Call your doctor for any of the followin. Chest pain, and/or shortness of breath  2. Abdominal  pain, bloating or cramping that has not improved or does not respond to pain reliving medications (Tylenol or narcotics if prescribed)   3. Difficulty swallowing or feeling as though food or liquids are stuck in your throat   4. Sore throat lasting more than 2 days or pain that has worsened over time   5. Black or tarry stools   6. Nausea and/or vomiting that is not resolving or has not responded to anti-nausea medications prescribed to you   7. It has been over 8 to 10 hours since surgery and you are still not able to urinate (pass water)   8. Headache for over 24 hours   9. Fever over 100.5 F (38 C) lasting more than 24 hours after the procedure   10. Signs of jaundice or blockage (fever, chills, abdominal pain, yellowing of the whites of your eyes, yellowing of your skin, and/or passing darker than normal urine)     To contact a doctor, call:   [ ]Dr. Guru Landon @ [phone] (GI Clinic) (Monday thru Friday 8:00am to 4:30pm)   [ ] [phone] and ask for the Gastroenterology resident on call (answered 24 hours a day)   [ ] Emergency Department: [facility]: [phone]     Take it easy when you get home.  Remember, same day surgery DOES NOT MEAN SAME DAY RECOVERY!  Healing is a gradual process.  You will need some time to recover - you may be more tired than you realize at first.  Rest and relax for at least the first 24 hours at home.  You'll feel better and heal faster if you take good care of yourself.        Recommendation:                             - Will recommend levaquin 500 mg by mouth daily for 7 days                        - Will recommend CT scan of abdomen and pelvis with IV                        contrast to re-evaluate necrotic collection/pseudocyst                        in 5 days given how liquid his collections are                        - Will schedule EGD (Esophagogastroduodenoscopy) under fluroscopy in 7-14 days for                        Axios stent removal                        - If patient develops fevers, chills and night sweats he                        needs to come to [facility] Emergency Room immediately as stent may be                        occluded                        - Findings discussed with patient and son

## 2021-03-31 NOTE — OR NURSING
Paged Dr. Landon for a brief op note. Informed Dr. Behrens re: elevated blood sugar of 235 and needing a sign-out. No orders received. Pt will resume his home meds I.e. Metformin.

## 2021-03-31 NOTE — ANESTHESIA POSTPROCEDURE EVALUATION
Patient: Refugio Rivera    Procedure(s):  ENDOSCOPIC ULTRASOUND, ESOPHAGOSCOPY / UPPER GASTROINTESTINAL TRACT  with axios stent placement balloon dilation of stent    Diagnosis:Fluid collection of pancreas [K86.89]  Diagnosis Additional Information: No value filed.    Anesthesia Type:  General    Note:  Disposition: Outpatient   Postop Pain Control: Uneventful            Sign Out: Well controlled pain   PONV: No   Neuro/Psych: Uneventful            Sign Out: Acceptable/Baseline neuro status   Airway/Respiratory: Uneventful            Sign Out: Acceptable/Baseline resp. status   CV/Hemodynamics: Uneventful            Sign Out: Acceptable CV status   Other NRE: NONE   DID A NON-ROUTINE EVENT OCCUR? No         Last vitals:  Vitals:    03/31/21 0945 03/31/21 1000 03/31/21 1015   BP: 132/88 (!) 142/94    Pulse:  94    Resp: 16 16    Temp:   36.1  C (96.9  F)   SpO2:  100% 100%       Last vitals prior to Anesthesia Care Transfer:  CRNA VITALS  3/31/2021 0812 - 3/31/2021 0912      3/31/2021             Resp Rate (observed):  (!) 1          Electronically Signed By: Christopher J. Behrens, MD  March 31, 2021  11:42 AM

## 2021-03-31 NOTE — ANESTHESIA CARE TRANSFER NOTE
Patient: Refugio Carneyier    Procedure(s):  ENDOSCOPIC ULTRASOUND, ESOPHAGOSCOPY / UPPER GASTROINTESTINAL TRACT  with axios stent placement balloon dilation of stent    Diagnosis: Fluid collection of pancreas [K86.89]  Diagnosis Additional Information: No value filed.    Anesthesia Type:   General     Note:    Oropharynx: oropharynx clear of all foreign objects  Level of Consciousness: awake  Oxygen Supplementation: face mask  Level of Supplemental Oxygen (L/min / FiO2): 6  Independent Airway: airway patency satisfactory and stable  Dentition: dentition unchanged  Vital Signs Stable: post-procedure vital signs reviewed and stable  Report to RN Given: handoff report given  Patient transferred to: PACU    Handoff Report: Identifed the Patient, Identified the Reponsible Provider, Reviewed the pertinent medical history, Discussed the surgical course, Reviewed Intra-OP anesthesia mangement and issues during anesthesia, Set expectations for post-procedure period and Allowed opportunity for questions and acknowledgement of understanding      Vitals: (Last set prior to Anesthesia Care Transfer)  CRNA VITALS  3/31/2021 0812 - 3/31/2021 0857      3/31/2021             Resp Rate (observed):  (!) 1        Electronically Signed By: KEL Domínguez CRNA  March 31, 2021  8:57 AM

## 2021-03-31 NOTE — OR NURSING
Patient in preop for EUS/Esophagoscopy today, would like to verify plan for procedure prior to signing informed consent. Dr. Landon paged and informed.

## 2021-04-01 ENCOUNTER — PATIENT OUTREACH (OUTPATIENT)
Dept: GASTROENTEROLOGY | Facility: CLINIC | Age: 61
End: 2021-04-01

## 2021-04-01 ENCOUNTER — DOCUMENTATION ONLY (OUTPATIENT)
Dept: GASTROENTEROLOGY | Facility: CLINIC | Age: 61
End: 2021-04-01

## 2021-04-01 DIAGNOSIS — K86.89 FLUID COLLECTION OF PANCREAS: Primary | ICD-10-CM

## 2021-04-01 NOTE — TELEPHONE ENCOUNTER
Per Dr. Landon  CT locally Monday 4/5, possible procedure after based on imaging.    Will recommend CT scan of abdomen and pelvis with IV                        contrast to re-evaluate necrotic collection/pseudocyst                        in 5 days given how liquid his collections are                        - Will schedule EGD under fluroscopy in 7-14 days for                        Axios stent removal                        - If patient develops fevers, chills and night sweats he                        needs to come to [facility] ED immediately as stent may be                        occluded     Called patient, left message. Orders to be faxed to [facility]    ML

## 2021-04-02 ENCOUNTER — PREP FOR PROCEDURE (OUTPATIENT)
Dept: GASTROENTEROLOGY | Facility: CLINIC | Age: 61
End: 2021-04-02

## 2021-04-02 DIAGNOSIS — K85.91 NECROTIZING PANCREATITIS: Primary | ICD-10-CM

## 2021-04-02 NOTE — TELEPHONE ENCOUNTER
"Returned call again to discuss follow up CT and procedure.    Per patient he says he feels tired and weak, some pressure and pain in his midsection. Some Chills this morning, some vomiting yesterday. Reviewed plan with patient that if chills return he needs to go to [facility] ER right away per procedure note. Plan for now is that he will call [facility] and get CT on Monday, 4/5 and go to ER w/ fever or chills. Pt does not want to travel \"80 miles if I don't have to\", advised [facility] cannot help him and reviewed how he could get very ill quickly if stent was occluded.     Please assist in scheduling:     Procedure/Imaging/Clinic: EGD  Physician: Dr. Landon  Timing: TBD  Procedure length:60 min  Anesthesia:general  Dx: nec panc  Tier:2  Location: [facility]     Orders placed. Message sent to Dr. Landon about symptoms and scheduling. Will follow.    ML  "

## 2021-04-05 ENCOUNTER — PATIENT OUTREACH (OUTPATIENT)
Dept: GASTROENTEROLOGY | Facility: CLINIC | Age: 61
End: 2021-04-05

## 2021-04-05 DIAGNOSIS — Z11.59 ENCOUNTER FOR SCREENING FOR OTHER VIRAL DISEASES: ICD-10-CM

## 2021-04-05 PROBLEM — K85.91 NECROTIZING PANCREATITIS: Status: ACTIVE | Noted: 2021-04-05

## 2021-04-06 ENCOUNTER — PATIENT OUTREACH (OUTPATIENT)
Dept: GASTROENTEROLOGY | Facility: CLINIC | Age: 61
End: 2021-04-06

## 2021-04-06 NOTE — TELEPHONE ENCOUNTER
"Pt scheduled for procedure with Dr. Landon tomorrow, called today stating he wants to delay as he is \"tired\". Discussed Dr. Landon's recommendation that he come as scheduled, as the stent is meant to be short term and could cause complications if kept in too long. Patient persistently requests to reschedule and \"doesn't want to cancel last minute\"    Again impressed the importance of follow up imaging. Patient will try to get imaging done locally today or tomorrow. MD and scheduling updated.    ML  "

## 2021-04-09 ENCOUNTER — PATIENT OUTREACH (OUTPATIENT)
Dept: GASTROENTEROLOGY | Facility: CLINIC | Age: 61
End: 2021-04-09

## 2021-04-09 NOTE — TELEPHONE ENCOUNTER
Called patient to follow up on recommended CT and to make sure he's coming for procedure on Monday.     Patient says he has not gotten CT yet because he's been feeling ill. He states he cannot get it done today because [facility] does not do CT with contrast on Thursday and Friday. He does have a covid test scheduled for 4/10, however we will still plan for DOS test as he has a history of non compliance.     CT scheduled at 8am at [place] prior to procedure. Procedure scheduled for 9:30. Patient told to arrive at 7am to allow time for covid test and CT. I was direct with patient in stating I needed his assurance he was going to come as scheduled for procedure and again told him we could not delay procedure due to concern for complications. Pt agreed to plan above and said he will arrive at 7am on Monday.    RHONDA

## 2021-04-12 ENCOUNTER — APPOINTMENT (OUTPATIENT)
Dept: GENERAL RADIOLOGY | Facility: CLINIC | Age: 61
End: 2021-04-12
Attending: INTERNAL MEDICINE
Payer: COMMERCIAL

## 2021-04-12 ENCOUNTER — HOSPITAL ENCOUNTER (OUTPATIENT)
Facility: CLINIC | Age: 61
End: 2021-04-12
Attending: INTERNAL MEDICINE | Admitting: INTERNAL MEDICINE
Payer: COMMERCIAL

## 2021-04-12 ENCOUNTER — PREP FOR PROCEDURE (OUTPATIENT)
Dept: GASTROENTEROLOGY | Facility: CLINIC | Age: 61
End: 2021-04-12

## 2021-04-12 ENCOUNTER — HOSPITAL ENCOUNTER (OUTPATIENT)
Facility: CLINIC | Age: 61
Discharge: HOME | End: 2021-04-12
Attending: INTERNAL MEDICINE | Admitting: INTERNAL MEDICINE
Payer: COMMERCIAL

## 2021-04-12 ENCOUNTER — CARE COORDINATION (OUTPATIENT)
Dept: GASTROENTEROLOGY | Facility: CLINIC | Age: 61
End: 2021-04-12

## 2021-04-12 ENCOUNTER — HOSPITAL ENCOUNTER (OUTPATIENT)
Dept: CT IMAGING | Facility: CLINIC | Age: 61
End: 2021-04-12
Attending: INTERNAL MEDICINE | Admitting: INTERNAL MEDICINE
Payer: COMMERCIAL

## 2021-04-12 ENCOUNTER — ANESTHESIA (OUTPATIENT)
Dept: SURGERY | Facility: CLINIC | Age: 61
End: 2021-04-12
Payer: COMMERCIAL

## 2021-04-12 ENCOUNTER — ANESTHESIA EVENT (OUTPATIENT)
Dept: SURGERY | Facility: CLINIC | Age: 61
End: 2021-04-12
Payer: COMMERCIAL

## 2021-04-12 VITALS
RESPIRATION RATE: 18 BRPM | OXYGEN SATURATION: 100 % | WEIGHT: 132.94 LBS | HEART RATE: 94 BPM | BODY MASS INDEX: 18.01 KG/M2 | SYSTOLIC BLOOD PRESSURE: 110 MMHG | HEIGHT: 72 IN | TEMPERATURE: 97.6 F | DIASTOLIC BLOOD PRESSURE: 71 MMHG

## 2021-04-12 DIAGNOSIS — K86.89 PANCREATIC NECROSIS: Primary | ICD-10-CM

## 2021-04-12 DIAGNOSIS — K85.91 NECROTIZING PANCREATITIS: ICD-10-CM

## 2021-04-12 DIAGNOSIS — K86.89 PANCREATIC NECROSIS: ICD-10-CM

## 2021-04-12 DIAGNOSIS — K85.91 NECROTIZING PANCREATITIS: Primary | ICD-10-CM

## 2021-04-12 DIAGNOSIS — Z11.59 ENCOUNTER FOR SCREENING FOR OTHER VIRAL DISEASES: ICD-10-CM

## 2021-04-12 DIAGNOSIS — K86.89 FLUID COLLECTION OF PANCREAS: ICD-10-CM

## 2021-04-12 LAB
ERYTHROCYTE [DISTWIDTH] IN BLOOD BY AUTOMATED COUNT: 15.2 % (ref 10–15)
GLUCOSE BLDC GLUCOMTR-MCNC: 170 MG/DL (ref 70–99)
GLUCOSE BLDC GLUCOMTR-MCNC: 183 MG/DL (ref 70–99)
GLUCOSE BLDC GLUCOMTR-MCNC: 303 MG/DL (ref 70–99)
GLUCOSE BLDC GLUCOMTR-MCNC: 363 MG/DL (ref 70–99)
HCT VFR BLD AUTO: 29.9 % (ref 40–53)
HGB BLD-MCNC: 9.7 G/DL (ref 13.3–17.7)
INR PPP: 1.2 (ref 0.86–1.14)
LABORATORY COMMENT REPORT: NORMAL
MCH RBC QN AUTO: 27.8 PG (ref 26.5–33)
MCHC RBC AUTO-ENTMCNC: 32.4 G/DL (ref 31.5–36.5)
MCV RBC AUTO: 86 FL (ref 78–100)
PLATELET # BLD AUTO: 436 10E9/L (ref 150–450)
RBC # BLD AUTO: 3.49 10E12/L (ref 4.4–5.9)
SARS-COV-2 RNA RESP QL NAA+PROBE: NEGATIVE
SPECIMEN SOURCE: NORMAL
UPPER GI ENDOSCOPY: NORMAL
WBC # BLD AUTO: 9.8 10E9/L (ref 4–11)

## 2021-04-12 PROCEDURE — 250N000013 HC RX MED GY IP 250 OP 250 PS 637: Performed by: ANESTHESIOLOGY

## 2021-04-12 PROCEDURE — 370N000017 HC ANESTHESIA TECHNICAL FEE, PER MIN: Performed by: INTERNAL MEDICINE

## 2021-04-12 PROCEDURE — 250N000011 HC RX IP 250 OP 636: Performed by: ANESTHESIOLOGY

## 2021-04-12 PROCEDURE — 272N000001 HC OR GENERAL SUPPLY STERILE: Performed by: INTERNAL MEDICINE

## 2021-04-12 PROCEDURE — C1769 GUIDE WIRE: HCPCS | Performed by: INTERNAL MEDICINE

## 2021-04-12 PROCEDURE — 74170 CT ABD WO CNTRST FLWD CNTRST: CPT

## 2021-04-12 PROCEDURE — 999N000141 HC STATISTIC PRE-PROCEDURE NURSING ASSESSMENT: Performed by: INTERNAL MEDICINE

## 2021-04-12 PROCEDURE — C1876 STENT, NON-COA/NON-COV W/DEL: HCPCS | Performed by: INTERNAL MEDICINE

## 2021-04-12 PROCEDURE — 82962 GLUCOSE BLOOD TEST: CPT

## 2021-04-12 PROCEDURE — 710N000012 HC RECOVERY PHASE 2, PER MINUTE: Performed by: INTERNAL MEDICINE

## 2021-04-12 PROCEDURE — U0003 INFECTIOUS AGENT DETECTION BY NUCLEIC ACID (DNA OR RNA); SEVERE ACUTE RESPIRATORY SYNDROME CORONAVIRUS 2 (SARS-COV-2) (CORONAVIRUS DISEASE [COVID-19]), AMPLIFIED PROBE TECHNIQUE, MAKING USE OF HIGH THROUGHPUT TECHNOLOGIES AS DESCRIBED BY CMS-2020-01-R: HCPCS | Performed by: INTERNAL MEDICINE

## 2021-04-12 PROCEDURE — 74170 CT ABD WO CNTRST FLWD CNTRST: CPT | Mod: 26 | Performed by: RADIOLOGY

## 2021-04-12 PROCEDURE — 36415 COLL VENOUS BLD VENIPUNCTURE: CPT | Performed by: STUDENT IN AN ORGANIZED HEALTH CARE EDUCATION/TRAINING PROGRAM

## 2021-04-12 PROCEDURE — 85027 COMPLETE CBC AUTOMATED: CPT | Performed by: STUDENT IN AN ORGANIZED HEALTH CARE EDUCATION/TRAINING PROGRAM

## 2021-04-12 PROCEDURE — 250N000011 HC RX IP 250 OP 636: Performed by: NURSE ANESTHETIST, CERTIFIED REGISTERED

## 2021-04-12 PROCEDURE — 999N000127 HC STATISTIC PERIPHERAL IV START W US GUIDANCE

## 2021-04-12 PROCEDURE — 250N000009 HC RX 250: Performed by: NURSE ANESTHETIST, CERTIFIED REGISTERED

## 2021-04-12 PROCEDURE — 250N000025 HC SEVOFLURANE, PER MIN: Performed by: INTERNAL MEDICINE

## 2021-04-12 PROCEDURE — P9041 ALBUMIN (HUMAN),5%, 50ML: HCPCS | Performed by: NURSE ANESTHETIST, CERTIFIED REGISTERED

## 2021-04-12 PROCEDURE — C1726 CATH, BAL DIL, NON-VASCULAR: HCPCS | Performed by: INTERNAL MEDICINE

## 2021-04-12 PROCEDURE — 710N000009 HC RECOVERY PHASE 1, LEVEL 1, PER MIN: Performed by: INTERNAL MEDICINE

## 2021-04-12 PROCEDURE — 258N000003 HC RX IP 258 OP 636: Performed by: ANESTHESIOLOGY

## 2021-04-12 PROCEDURE — 250N000011 HC RX IP 250 OP 636: Performed by: RADIOLOGY

## 2021-04-12 PROCEDURE — 85610 PROTHROMBIN TIME: CPT | Performed by: STUDENT IN AN ORGANIZED HEALTH CARE EDUCATION/TRAINING PROGRAM

## 2021-04-12 PROCEDURE — 999N000179 XR SURGERY CARM FLUORO LESS THAN 5 MIN W STILLS: Mod: TC

## 2021-04-12 PROCEDURE — 258N000003 HC RX IP 258 OP 636: Performed by: NURSE ANESTHETIST, CERTIFIED REGISTERED

## 2021-04-12 PROCEDURE — 250N000009 HC RX 250: Performed by: INTERNAL MEDICINE

## 2021-04-12 PROCEDURE — 360N000075 HC SURGERY LEVEL 2, PER MIN: Performed by: INTERNAL MEDICINE

## 2021-04-12 PROCEDURE — U0005 INFEC AGEN DETEC AMPLI PROBE: HCPCS | Performed by: INTERNAL MEDICINE

## 2021-04-12 DEVICE — STENT SOLUS BILIARY 10FRX07CM DBL PIGTAIL W/INTRO G25673: Type: IMPLANTABLE DEVICE | Site: STOMACH | Status: FUNCTIONAL

## 2021-04-12 DEVICE — STENT SOLUS BILIARY 10FRX03CM DBL PIGTAIL W/INTRO G25670: Type: IMPLANTABLE DEVICE | Site: STOMACH | Status: FUNCTIONAL

## 2021-04-12 RX ORDER — LIDOCAINE 40 MG/G
CREAM TOPICAL
Status: DISCONTINUED | OUTPATIENT
Start: 2021-04-12 | End: 2021-04-12 | Stop reason: HOSPADM

## 2021-04-12 RX ORDER — NALOXONE HYDROCHLORIDE 0.4 MG/ML
0.2 INJECTION, SOLUTION INTRAMUSCULAR; INTRAVENOUS; SUBCUTANEOUS
Status: DISCONTINUED | OUTPATIENT
Start: 2021-04-12 | End: 2021-04-12 | Stop reason: HOSPADM

## 2021-04-12 RX ORDER — LEVOFLOXACIN 5 MG/ML
INJECTION, SOLUTION INTRAVENOUS PRN
Status: DISCONTINUED | OUTPATIENT
Start: 2021-04-12 | End: 2021-04-12

## 2021-04-12 RX ORDER — MEPERIDINE HYDROCHLORIDE 25 MG/ML
12.5 INJECTION INTRAMUSCULAR; INTRAVENOUS; SUBCUTANEOUS
Status: DISCONTINUED | OUTPATIENT
Start: 2021-04-12 | End: 2021-04-12 | Stop reason: HOSPADM

## 2021-04-12 RX ORDER — FENTANYL CITRATE 50 UG/ML
25-50 INJECTION, SOLUTION INTRAMUSCULAR; INTRAVENOUS EVERY 5 MIN PRN
Status: DISCONTINUED | OUTPATIENT
Start: 2021-04-12 | End: 2021-04-12 | Stop reason: HOSPADM

## 2021-04-12 RX ORDER — NALOXONE HYDROCHLORIDE 0.4 MG/ML
0.4 INJECTION, SOLUTION INTRAMUSCULAR; INTRAVENOUS; SUBCUTANEOUS
Status: DISCONTINUED | OUTPATIENT
Start: 2021-04-12 | End: 2021-04-12 | Stop reason: HOSPADM

## 2021-04-12 RX ORDER — PROPOFOL 10 MG/ML
INJECTION, EMULSION INTRAVENOUS PRN
Status: DISCONTINUED | OUTPATIENT
Start: 2021-04-12 | End: 2021-04-12

## 2021-04-12 RX ORDER — FENTANYL CITRATE 50 UG/ML
INJECTION, SOLUTION INTRAMUSCULAR; INTRAVENOUS PRN
Status: DISCONTINUED | OUTPATIENT
Start: 2021-04-12 | End: 2021-04-12

## 2021-04-12 RX ORDER — IOPAMIDOL 755 MG/ML
81 INJECTION, SOLUTION INTRAVASCULAR ONCE
Status: COMPLETED | OUTPATIENT
Start: 2021-04-12 | End: 2021-04-12

## 2021-04-12 RX ORDER — METOCLOPRAMIDE HYDROCHLORIDE 5 MG/ML
10 INJECTION INTRAMUSCULAR; INTRAVENOUS ONCE
Status: COMPLETED | OUTPATIENT
Start: 2021-04-12 | End: 2021-04-12

## 2021-04-12 RX ORDER — ONDANSETRON 4 MG/1
4 TABLET, ORALLY DISINTEGRATING ORAL EVERY 30 MIN PRN
Status: DISCONTINUED | OUTPATIENT
Start: 2021-04-12 | End: 2021-04-12 | Stop reason: HOSPADM

## 2021-04-12 RX ORDER — ALBUMIN, HUMAN INJ 5% 5 %
SOLUTION INTRAVENOUS CONTINUOUS PRN
Status: DISCONTINUED | OUTPATIENT
Start: 2021-04-12 | End: 2021-04-12

## 2021-04-12 RX ORDER — ONDANSETRON 2 MG/ML
4 INJECTION INTRAMUSCULAR; INTRAVENOUS EVERY 30 MIN PRN
Status: DISCONTINUED | OUTPATIENT
Start: 2021-04-12 | End: 2021-04-12 | Stop reason: HOSPADM

## 2021-04-12 RX ORDER — FLUMAZENIL 0.1 MG/ML
0.2 INJECTION, SOLUTION INTRAVENOUS
Status: DISCONTINUED | OUTPATIENT
Start: 2021-04-12 | End: 2021-04-12 | Stop reason: HOSPADM

## 2021-04-12 RX ORDER — SODIUM CHLORIDE, SODIUM LACTATE, POTASSIUM CHLORIDE, CALCIUM CHLORIDE 600; 310; 30; 20 MG/100ML; MG/100ML; MG/100ML; MG/100ML
INJECTION, SOLUTION INTRAVENOUS CONTINUOUS
Status: DISCONTINUED | OUTPATIENT
Start: 2021-04-12 | End: 2021-04-12 | Stop reason: HOSPADM

## 2021-04-12 RX ORDER — LIDOCAINE HYDROCHLORIDE 20 MG/ML
INJECTION, SOLUTION INFILTRATION; PERINEURAL PRN
Status: DISCONTINUED | OUTPATIENT
Start: 2021-04-12 | End: 2021-04-12

## 2021-04-12 RX ORDER — ONDANSETRON 2 MG/ML
INJECTION INTRAMUSCULAR; INTRAVENOUS PRN
Status: DISCONTINUED | OUTPATIENT
Start: 2021-04-12 | End: 2021-04-12

## 2021-04-12 RX ORDER — ESMOLOL HYDROCHLORIDE 10 MG/ML
INJECTION INTRAVENOUS PRN
Status: DISCONTINUED | OUTPATIENT
Start: 2021-04-12 | End: 2021-04-12

## 2021-04-12 RX ADMIN — FENTANYL CITRATE 25 MCG: 50 INJECTION, SOLUTION INTRAMUSCULAR; INTRAVENOUS at 11:34

## 2021-04-12 RX ADMIN — METOCLOPRAMIDE 10 MG: 5 INJECTION, SOLUTION INTRAMUSCULAR; INTRAVENOUS at 09:28

## 2021-04-12 RX ADMIN — PHENYLEPHRINE HYDROCHLORIDE 100 MCG: 10 INJECTION INTRAVENOUS at 11:03

## 2021-04-12 RX ADMIN — ESMOLOL HYDROCHLORIDE 40 MG: 10 INJECTION, SOLUTION INTRAVENOUS at 11:45

## 2021-04-12 RX ADMIN — SODIUM CHLORIDE, POTASSIUM CHLORIDE, SODIUM LACTATE AND CALCIUM CHLORIDE: 600; 310; 30; 20 INJECTION, SOLUTION INTRAVENOUS at 11:38

## 2021-04-12 RX ADMIN — MIDAZOLAM 2 MG: 1 INJECTION INTRAMUSCULAR; INTRAVENOUS at 09:56

## 2021-04-12 RX ADMIN — PHENYLEPHRINE HYDROCHLORIDE 50 MCG: 10 INJECTION INTRAVENOUS at 10:10

## 2021-04-12 RX ADMIN — PHENYLEPHRINE HYDROCHLORIDE 100 MCG: 10 INJECTION INTRAVENOUS at 10:29

## 2021-04-12 RX ADMIN — SODIUM CHLORIDE, POTASSIUM CHLORIDE, SODIUM LACTATE AND CALCIUM CHLORIDE: 600; 310; 30; 20 INJECTION, SOLUTION INTRAVENOUS at 10:00

## 2021-04-12 RX ADMIN — PHENYLEPHRINE HYDROCHLORIDE 100 MCG: 10 INJECTION INTRAVENOUS at 10:53

## 2021-04-12 RX ADMIN — ROCURONIUM BROMIDE 40 MG: 10 INJECTION INTRAVENOUS at 10:06

## 2021-04-12 RX ADMIN — LEVOFLOXACIN 500 MG: 5 INJECTION, SOLUTION INTRAVENOUS at 10:21

## 2021-04-12 RX ADMIN — FENTANYL CITRATE 25 MCG: 50 INJECTION, SOLUTION INTRAMUSCULAR; INTRAVENOUS at 11:57

## 2021-04-12 RX ADMIN — IOPAMIDOL 81 ML: 755 INJECTION, SOLUTION INTRAVENOUS at 09:37

## 2021-04-12 RX ADMIN — FENTANYL CITRATE 25 MCG: 50 INJECTION, SOLUTION INTRAMUSCULAR; INTRAVENOUS at 11:45

## 2021-04-12 RX ADMIN — HUMAN INSULIN 5 UNITS: 100 INJECTION, SOLUTION SUBCUTANEOUS at 09:03

## 2021-04-12 RX ADMIN — PHENYLEPHRINE HYDROCHLORIDE 50 MCG: 10 INJECTION INTRAVENOUS at 10:17

## 2021-04-12 RX ADMIN — PHENYLEPHRINE HYDROCHLORIDE 200 MCG: 10 INJECTION INTRAVENOUS at 11:10

## 2021-04-12 RX ADMIN — ONDANSETRON 4 MG: 2 INJECTION INTRAMUSCULAR; INTRAVENOUS at 11:37

## 2021-04-12 RX ADMIN — FENTANYL CITRATE 50 MCG: 50 INJECTION, SOLUTION INTRAMUSCULAR; INTRAVENOUS at 11:43

## 2021-04-12 RX ADMIN — LIDOCAINE HYDROCHLORIDE 100 MG: 20 INJECTION, SOLUTION INFILTRATION; PERINEURAL at 10:06

## 2021-04-12 RX ADMIN — ALBUMIN (HUMAN): 12.5 SOLUTION INTRAVENOUS at 11:26

## 2021-04-12 RX ADMIN — SUGAMMADEX 200 MG: 100 INJECTION, SOLUTION INTRAVENOUS at 11:40

## 2021-04-12 RX ADMIN — ALBUMIN (HUMAN): 12.5 SOLUTION INTRAVENOUS at 11:13

## 2021-04-12 RX ADMIN — PROPOFOL 70 MG: 10 INJECTION, EMULSION INTRAVENOUS at 10:06

## 2021-04-12 ASSESSMENT — MIFFLIN-ST. JEOR: SCORE: 1456

## 2021-04-12 NOTE — PROGRESS NOTES
Called OLYA Liriano because pt's BS is 363 and pt has a history of asthma but hasn't used his albuterol inhaler in more than a month. OLYA YBARRA ordered 5 units of regular insulin IV. OLYA said if the pt doesn't feel like he needs to use the albuterol inhaler, than he doesn't need it preop.  Deisi Casas RN on 4/12/2021 at 8:59 AM     Writer rechecked BS and is 303. OLYA said will start insulin drip in the OR. OLYA YBARRA ordered 10mg of Reglan for nausea. Deisi Casas RN on 4/12/2021 at 9:25 AM

## 2021-04-12 NOTE — ANESTHESIA CARE TRANSFER NOTE
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    Anesthesia Type:   General     Note:    Oropharynx: oropharynx clear of all foreign objects and spontaneously breathing  Level of Consciousness: awake  Oxygen Supplementation: nasal cannula  Level of Supplemental Oxygen (L/min / FiO2): 4  Independent Airway: airway patency satisfactory and stable    Vital Signs Stable: post-procedure vital signs reviewed and stable  Report to RN Given: handoff report given  Patient transferred to: PACU    Handoff Report: Identifed the Patient, Identified the Reponsible Provider, Reviewed the pertinent medical history, Discussed the surgical course, Reviewed Intra-OP anesthesia mangement and issues during anesthesia, Set expectations for post-procedure period and Allowed opportunity for questions and acknowledgement of understanding      Vitals: (Last set prior to Anesthesia Care Transfer)  CRNA VITALS  4/12/2021 1118 - 4/12/2021 1152      4/12/2021             Pulse:  79    SpO2:  100 %        Electronically Signed By: KEL Delatorre CRNA  April 12, 2021  11:52 AM

## 2021-04-12 NOTE — ANESTHESIA PROCEDURE NOTES
Airway       Patient location during procedure: OR  Staff -        Performed By: resident  Consent for Airway        Urgency: elective  Indications and Patient Condition       Indications for airway management: lindsey-procedural       Induction type:intravenous       Mask difficulty assessment: 1 - vent by mask    Final Airway Details       Final airway type: endotracheal airway       Successful airway: Oral and ETT - single  Endotracheal Airway Details        ETT size (mm): 7.5       Cuffed: yes       Successful intubation technique: direct laryngoscopy       DL Blade Type: MAC 3       Grade View of Cords: 1       Adjucts: stylet       Position: Right       Measured from: gums/teeth       Secured at (cm): 23       Bite Block used: Endo Block.    Post intubation assessment        Placement verified by: capnometry, equal breath sounds and chest rise        Number of attempts at approach: 1       Secured with: silk tape       Ease of procedure: easy       Dentition: Intact and Unchanged    Medication(s) Administered   Medication Administration Time: 4/12/2021 10:10 AM

## 2021-04-12 NOTE — ANESTHESIA POSTPROCEDURE EVALUATION
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    Anesthesia Type:  General    Note:  Anesthesia Post Evaluation    Last vitals:  Vitals:    04/12/21 0749   BP: 109/74   Pulse: 101   Resp: 16   Temp: 36.9  C (98.4  F)   SpO2: 99%       Last vitals prior to Anesthesia Care Transfer:  CRNA VITALS  4/12/2021 1118 - 4/12/2021 1159      4/12/2021             Pulse:  79    SpO2:  100 %          Electronically Signed By: Linda Mohan MD  April 12, 2021  11:59 AM

## 2021-04-12 NOTE — PROGRESS NOTES
Per Dr. Landon    Can we get him scheduled for next Wednesday. Will need a CT scan with IV contrast. I have talked a lot about his compliance and I hope he shows up     Ct ordered.    Please assist in scheduling:     Procedure/Imaging/Clinic: EGD  Physician: Dr. Griffith  Timin/21  Procedure length:50 min  Anesthesia:general  Dx: pancreatic necrosis  Tier:3  Location: [facility]     Will call patient tomorrow to discuss.    ML

## 2021-04-12 NOTE — ANESTHESIA POSTPROCEDURE EVALUATION
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    Anesthesia Type:  General    Note:  Disposition: Outpatient   Postop Pain Control: Uneventful            Sign Out: Well controlled pain   PONV: No   Neuro/Psych: Uneventful            Sign Out: Acceptable/Baseline neuro status   Airway/Respiratory: Uneventful            Sign Out: Acceptable/Baseline resp. status   CV/Hemodynamics: Uneventful            Sign Out: Acceptable CV status   Other NRE: NONE   DID A NON-ROUTINE EVENT OCCUR? No         Last vitals:  Vitals:    04/12/21 0749   BP: 109/74   Pulse: 101   Resp: 16   Temp: 36.9  C (98.4  F)   SpO2: 99%       Last vitals prior to Anesthesia Care Transfer:  CRNA VITALS  4/12/2021 1118 - 4/12/2021 1158      4/12/2021             Pulse:  79    SpO2:  100 %          Electronically Signed By: Linda Mohan MD  April 12, 2021  11:58 AM

## 2021-04-12 NOTE — ANESTHESIA PREPROCEDURE EVALUATION
Anesthesia Pre-Procedure Evaluation    Patient: Refugio Rivera   MRN: [id number] : 1960        Preoperative Diagnosis: Fluid collection of pancreas [K86.89]   Procedure : Procedure(s):  ENDOSCOPIC ULTRASOUND, ESOPHAGOSCOPY / UPPER GASTROINTESTINAL TRACT (GI)     Past Medical History:   Diagnosis Date     C. difficile colitis      Diabetes (H)      DVT (deep venous thrombosis) (H)      Gastroesophageal reflux disease      History of blood transfusion          Hypertension      Pancreatic disease     large pseudocyst     Pulmonary embolism (H)       Past Surgical History:   Procedure Laterality Date     ABDOMEN SURGERY      IVC filter 20 [facility]     CHOLECYSTECTOMY       ENDOSCOPIC ULTRASOUND UPPER GASTROINTESTINAL TRACT (GI) N/A 3/31/2021    Procedure: ENDOSCOPIC ULTRASOUND, ESOPHAGOSCOPY / UPPER GASTROINTESTINAL TRACT  with axios stent placement balloon dilation of stent;  Surgeon: Guru Markei Landon MD;  Location:  OR      No Known Allergies   Social History     Tobacco Use     Smoking status: Former Smoker     Quit date:      Years since quittin.2     Smokeless tobacco: Never Used   Substance Use Topics     Alcohol use: Never     Frequency: Never      Wt Readings from Last 1 Encounters:   21 60.3 kg (132 lb 15 oz)        Anesthesia Evaluation   Pt has had prior anesthetic. Type: General and MAC.        ROS/MED HX  ENT/Pulmonary:       Neurologic:       Cardiovascular:     (+) hypertension----- (-) murmur and wheezes   METS/Exercise Tolerance:     Hematologic:       Musculoskeletal:       GI/Hepatic:     (+) GERD,     Renal/Genitourinary:       Endo:     (+) type II DM,     Psychiatric/Substance Use:       Infectious Disease:       Malignancy:       Other:            Physical Exam    Airway        Mallampati: II   TM distance: > 3 FB   Neck ROM: full   Mouth opening: > 3 cm    Respiratory Devices and Support         Dental  no notable dental history          Cardiovascular          Rhythm and rate: regular and normal (-) no systolic click and no murmur    Pulmonary   pulmonary exam normal        breath sounds clear to auscultation   (-) no wheezes        OUTSIDE LABS:  CBC: No results found for: WBC, HGB, HCT, PLT  BMP:   Lab Results   Component Value Date    POTASSIUM 4.3 03/25/2021    CR 1.36 (H) 03/25/2021     (H) 03/25/2021     COAGS:   Lab Results   Component Value Date    INR 1.14 03/31/2021     POC:   Lab Results   Component Value Date     (H) 03/31/2021     HEPATIC: No results found for: ALBUMIN, PROTTOTAL, ALT, AST, GGT, ALKPHOS, BILITOTAL, BILIDIRECT, MAXIMO  OTHER: No results found for: PH, LACT, A1C, YASH, PHOS, MAG, LIPASE, AMYLASE, TSH, T4, T3, CRP, SED    Anesthesia Plan    ASA Status:  3   NPO Status:  NPO Appropriate    Anesthesia Type: General.     - Airway: ETT   Induction: Intravenous.   Maintenance: Inhalation.        Consents    Anesthesia Plan(s) and associated risks, benefits, and realistic alternatives discussed. Questions answered and patient/representative(s) expressed understanding.     - Discussed with:  Patient      - Extended Intubation/Ventilatory Support Discussed: Yes.      - Patient is DNR/DNI Status: No    Use of blood products discussed: Yes.     - Discussed with: Patient.     Postoperative Care    Pain management: IV analgesics.   PONV prophylaxis: Ondansetron (or other 5HT-3), Dexamethasone or Solumedrol     Comments:                    Linda Mohan MD

## 2021-04-13 ENCOUNTER — PATIENT OUTREACH (OUTPATIENT)
Dept: GASTROENTEROLOGY | Facility: CLINIC | Age: 61
End: 2021-04-13

## 2021-04-13 DIAGNOSIS — K85.91 NECROTIZING PANCREATITIS: Primary | ICD-10-CM

## 2021-04-13 NOTE — TELEPHONE ENCOUNTER
Per Dr. Landon after ERCP on 4/12  Can we get him scheduled for next Wednesday. Will need a CT scan with IV contrast    Orders in, pt scheduled for ERCP on 4/21, called to discuss timing and to check on patient. CT ordered.    Left message.    ML

## 2021-04-14 ENCOUNTER — PATIENT OUTREACH (OUTPATIENT)
Dept: GASTROENTEROLOGY | Facility: CLINIC | Age: 61
End: 2021-04-14

## 2021-04-14 RX ORDER — ONDANSETRON 2 MG/ML
4 INJECTION INTRAMUSCULAR; INTRAVENOUS
Status: CANCELLED | OUTPATIENT
Start: 2021-04-14

## 2021-04-14 RX ORDER — LIDOCAINE 40 MG/G
CREAM TOPICAL
Status: CANCELLED | OUTPATIENT
Start: 2021-04-14

## 2021-04-14 NOTE — TELEPHONE ENCOUNTER
"Pt says he's feeling better. Can come back for procedure on 4/21 if he can \"get a ride\". Pt has no preference for time of day. Will need Ct and covid test prior so cannot be first case. Will work with scheduling and arrange follow up visit.    ML  "

## 2021-04-15 NOTE — TELEPHONE ENCOUNTER
Called to arrange CT prior to procedure. Schduled for procedure at 9:20 am. Needs to arrive 2.5 hours early to allow for rapid covid test, 6:50 am.   CT scheduled at 8am    Message left for patient, message sent to [facility] to auth a rapid covid test.    ML

## 2021-04-19 ENCOUNTER — PATIENT OUTREACH (OUTPATIENT)
Dept: GASTROENTEROLOGY | Facility: CLINIC | Age: 61
End: 2021-04-19

## 2021-04-19 NOTE — TELEPHONE ENCOUNTER
Called patient to confirm follow up plans for this week. Left message, again noting 6:50 arrival on 4/21  ML

## 2021-04-20 ENCOUNTER — PATIENT OUTREACH (OUTPATIENT)
Dept: GASTROENTEROLOGY | Facility: CLINIC | Age: 61
End: 2021-04-20

## 2021-04-20 NOTE — TELEPHONE ENCOUNTER
Patient called, noting he lost his ride for procedure tomorrow. Returned call to discuss reschedule date.  Will reschedule 4/26, with CT and covid test prior.     ML

## 2021-04-20 NOTE — TELEPHONE ENCOUNTER
Pts procedure rescheduled to 4/26. Arrival at 11:25 am for rapid covid test. CT at 10am, arrive at 9:30 am.     Pt will contact medical transportation and understands plan for 4/26.    ML

## 2021-04-21 RX ORDER — LIDOCAINE 40 MG/G
CREAM TOPICAL
Status: CANCELLED | OUTPATIENT
Start: 2021-04-21

## 2021-04-25 ENCOUNTER — ANESTHESIA EVENT (OUTPATIENT)
Dept: SURGERY | Facility: CLINIC | Age: 61
End: 2021-04-25

## 2021-04-26 ENCOUNTER — PATIENT OUTREACH (OUTPATIENT)
Dept: GASTROENTEROLOGY | Facility: CLINIC | Age: 61
End: 2021-04-26

## 2021-04-26 ENCOUNTER — ANESTHESIA (OUTPATIENT)
Dept: SURGERY | Facility: CLINIC | Age: 61
End: 2021-04-26

## 2021-04-26 NOTE — ANESTHESIA PREPROCEDURE EVALUATION
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 difficile colitis      Diabetes (H)      DVT (deep venous thrombosis) (H)      Gastroesophageal reflux disease      History of blood transfusion          Hypertension      Pancreatic disease     large pseudocyst     Pulmonary embolism (H)       Past Surgical History:   Procedure Laterality Date     ABDOMEN SURGERY      IVC filter 20 [facility]     CHOLECYSTECTOMY       ENDOSCOPIC ULTRASOUND UPPER GASTROINTESTINAL TRACT (GI) N/A 3/31/2021    Procedure: ENDOSCOPIC ULTRASOUND, ESOPHAGOSCOPY / UPPER GASTROINTESTINAL TRACT  with axios stent placement balloon dilation of stent;  Surgeon: Guru Markie Landon MD;  Location: [facility] OR     ENDOSCOPIC ULTRASOUND, ESOPHAGOSCOPY, GASTROSCOPY, DUODENOSCOPY (EGD), NECROSECTOMY N/A 2021    Procedure: ESOPHAGOGASTRODUODENOSCOPY, WITH  ENDOSCOPIC EXCISION OF NECROTIC PANCREATIC TISSUE and stent exchange;  Surgeon: Guru Markie Landon MD;  Location: [facility] OR      No Known Allergies   Social History     Tobacco Use     Smoking status: Former Smoker     Quit date:      Years since quittin.3     Smokeless tobacco: Never Used   Substance Use Topics     Alcohol use: Never     Frequency: Never      Wt Readings from Last 1 Encounters:   21 60.3 kg (132 lb 15 oz)        Anesthesia Evaluation            ROS/MED HX  ENT/Pulmonary:       Neurologic:       Cardiovascular:     (+) hypertension-----    METS/Exercise Tolerance:     Hematologic:     (+) History of blood clots,     Musculoskeletal:       GI/Hepatic:     (+) GERD,     Renal/Genitourinary:       Endo:     (+) type II DM,     Psychiatric/Substance Use:       Infectious Disease:       Malignancy:       Other:                OUTSIDE LABS:  CBC:   Lab Results   Component Value Date    WBC 9.8 04/12/2021    HGB 9.7 (L) 04/12/2021    HCT 29.9 (L) 04/12/2021     04/12/2021     BMP:   Lab Results   Component Value Date    POTASSIUM 4.3 03/25/2021    CR 1.36 (H) 03/25/2021     (H) 03/25/2021     COAGS:   Lab Results   Component Value Date    INR 1.20 (H) 04/12/2021     POC:   Lab Results   Component Value Date     (H) 04/12/2021     HEPATIC: No results found for: ALBUMIN, PROTTOTAL, ALT, AST, GGT, ALKPHOS, BILITOTAL, BILIDIRECT, MAXIMO  OTHER: No results found for: PH, LACT, A1C, YASH, PHOS, MAG, LIPASE, AMYLASE, TSH, T4, T3, CRP, SED    Anesthesia Plan    ASA Status:  3   NPO Status:  NPO Appropriate    Anesthesia Type: General.     - Airway: ETT   Induction: Intravenous.   Maintenance: Inhalation.        Consents    Anesthesia Plan(s) and associated risks, benefits, and realistic alternatives discussed. Questions answered and patient/representative(s) expressed understanding.     - Discussed with:  Patient      - Extended Intubation/Ventilatory Support Discussed: Yes.      - Patient is DNR/DNI Status: No    Use of blood products discussed: Yes.     - Discussed with: Patient.     Postoperative Care    Pain management: IV analgesics.   PONV prophylaxis: Ondansetron (or other 5HT-3), Dexamethasone or Solumedrol     Comments:                Christopher J. Behrens, MD

## 2021-04-26 NOTE — OR NURSING
Pt did not show to hospital. Pt called with no answer. Waited until 1400 but no show and decided to cancel procedure.

## 2021-04-26 NOTE — TELEPHONE ENCOUNTER
"  Patient did not arrive for procedure as agreed on 4/26. He has a history of not showing or rescheduling procedure. This lack of follow through puts him at high risk for complications, including infection, sepsis or other serious negative consequences.    First referred to us in September 2020 related to a walled off pancreatic necrosis. Multiple calls were made to encourage patient to come to our ER, he would say he would come to ER for assessment and triage, but did not follow up.     Patient agreed to a virtual clinic in November 12 2020, had a virtual clinic visit, agreed to procedure to start to address his pancreatic necrosis. Procedure scheduled on 11/18/20, patient canceled this procedure due to lack of transportation citing transportation issues. Our office sent him a letter on 12/1/20 encouraging him to follow up, contact information was provided.     Patient was referred back to us for the same medical issues by Ignacio in January 2021. [facility] sent patient a letter on 1/28/21, asking him to call us when ready for procedure. Patient called and eventually scheduled procedure, had procedure, and Endoscopic Ultrasound, on 3/31/21.    Due to the type stent that was placed, the provider told the patient to return one week later for a follow up procedure. He was instructed, and agreed, to get a CT locally a few days prior to returning for repeat procedure on 4/7/21. Patient did not get CT locally as agreed nor did he come for procedure. Procedure again rescheduled 4/7, he was \"tired\" and could not come for procedure. Multiple calls were made to patient to assist in rescheduling procedure for 4/12, coordinating a CT at our facility prior to procedure.    Patient did arrive for procedure, an Upper GI Endoscopy, on 4/12. He was told to return for repeat procedure in 2 weeks, procedure was scheduled on 4/21/21. Patient then called to reschedule to 4/26/21 due to ride issues. He stated this time " he was organizing medical transportation to and from his procedure. Patient again did not show for procedure on 4/26/21. We have called patient to follow up, have not received a return call from him.    Patient current has cystgastrostomy stent placed, a type of stent that will likely stay in indefinitely. However, the necrotic collection is only partially cleared out, and the residual necrotic material which was to be removed in follow up procedure puts patient at higher risk for infection/ recurrent admission. Patients persistent lack of follow up puts his cash at risk of negative outcomes.     Risk management was involved to help in coordinating response to patient.

## 2021-05-26 ENCOUNTER — TELEPHONE (OUTPATIENT)
Dept: GASTROENTEROLOGY | Facility: CLINIC | Age: 61
End: 2021-05-26

## 2021-05-26 NOTE — TELEPHONE ENCOUNTER
Called patient to discuss his frequency cancellations and no shows to his procedures. Left direct number for patient to call back. Requested patient to call back.    Farrukh Cleaning LPN

## 2021-10-14 ENCOUNTER — TELEPHONE (OUTPATIENT)
Dept: GASTROENTEROLOGY | Facility: CLINIC | Age: 61
End: 2021-10-14

## 2021-10-14 NOTE — TELEPHONE ENCOUNTER
[facility] Call Center    Phone Message    May a detailed message be left on voicemail: yes     Reason for Call: Other: Patient needs to schedule a follow up with Dr. Landon.  Please follow up with patient.  Thank you.     Action Taken: Message routed to:  [facility] Rosalinda Duffy Team     Travel Screening: Not Applicable

## 2022-02-12 ENCOUNTER — HEALTH MAINTENANCE LETTER (OUTPATIENT)
Age: 62
End: 2022-02-12

## 2022-10-10 ENCOUNTER — HEALTH MAINTENANCE LETTER (OUTPATIENT)
Age: 62
End: 2022-10-10

## 2023-02-18 ENCOUNTER — HEALTH MAINTENANCE LETTER (OUTPATIENT)
Age: 63
End: 2023-02-18

## 2024-03-16 ENCOUNTER — HEALTH MAINTENANCE LETTER (OUTPATIENT)
Age: 64
End: 2024-03-16

## (undated) DEVICE — WIRE GUIDE 0.025"X450CM STR VISIGLIDE G-240-2545S

## (undated) DEVICE — SUCTION MANIFOLD NEPTUNE 2 SYS 4 PORT 0702-020-000

## (undated) DEVICE — ENDO DEVICE LOCKING AND BIOPSY CAP M00545261

## (undated) DEVICE — SNARE CAPIVATOR II POLYPECTOMY 20X240MM M00561240

## (undated) DEVICE — CATH BALLOON ELATION ESOPH/PYLORIC 15-16.5-18MMX180CM EPB15

## (undated) DEVICE — ENDO CAP AND TUBING STERILE FOR ENDOGATOR  100130

## (undated) DEVICE — CATH RETRIEVAL BALLOON EXTRACTOR PRO RX-S INJ ABOVE 9-12MM

## (undated) DEVICE — KIT ENDO FIRST STEP DISINFECTANT 200ML W/POUCH EP-4

## (undated) DEVICE — PACK ENDOSCOPY GI CUSTOM UMMC

## (undated) DEVICE — ENDO TUBING CO2 SMARTCAP STERILE DISP 100145CO2EXT

## (undated) DEVICE — SOL WATER IRRIG 1000ML BOTTLE 2F7114

## (undated) DEVICE — KIT CONNECTOR FOR OLYMPUS ENDOSCOPES DEFENDO 100310

## (undated) DEVICE — WIRE GUIDE 0.025"X270CM ANG VISIGLIDE G-240-2527A

## (undated) DEVICE — SPECIMEN CONTAINER 3OZ W/FORMALIN 59901

## (undated) DEVICE — ESU GROUND PAD ADULT W/CORD E7507

## (undated) DEVICE — SNARE CAPIVATOR II POLYPECTOMY 15X240MM M00561230

## (undated) DEVICE — ENDO BITE BLOCK ADULT OMNI-BLOC

## (undated) DEVICE — PAD CHUX UNDERPAD 23X24" 7136

## (undated) DEVICE — ENDO LIGATOR UNIV 6 BAND G31917 MBL-U-6

## (undated) RX ORDER — METOCLOPRAMIDE HYDROCHLORIDE 5 MG/ML
INJECTION INTRAMUSCULAR; INTRAVENOUS
Status: DISPENSED
Start: 2021-04-12

## (undated) RX ORDER — ESMOLOL HYDROCHLORIDE 10 MG/ML
INJECTION INTRAVENOUS
Status: DISPENSED
Start: 2021-04-12

## (undated) RX ORDER — PROPOFOL 10 MG/ML
INJECTION, EMULSION INTRAVENOUS
Status: DISPENSED
Start: 2021-04-12

## (undated) RX ORDER — LIDOCAINE HYDROCHLORIDE 20 MG/ML
INJECTION, SOLUTION EPIDURAL; INFILTRATION; INTRACAUDAL; PERINEURAL
Status: DISPENSED
Start: 2021-03-31

## (undated) RX ORDER — IOPAMIDOL 510 MG/ML
INJECTION, SOLUTION INTRAVASCULAR
Status: DISPENSED
Start: 2021-03-31

## (undated) RX ORDER — ALBUMIN, HUMAN INJ 5% 5 %
SOLUTION INTRAVENOUS
Status: DISPENSED
Start: 2021-04-12

## (undated) RX ORDER — LIDOCAINE HYDROCHLORIDE 20 MG/ML
INJECTION, SOLUTION EPIDURAL; INFILTRATION; INTRACAUDAL; PERINEURAL
Status: DISPENSED
Start: 2021-04-12

## (undated) RX ORDER — DEXAMETHASONE SODIUM PHOSPHATE 4 MG/ML
INJECTION, SOLUTION INTRA-ARTICULAR; INTRALESIONAL; INTRAMUSCULAR; INTRAVENOUS; SOFT TISSUE
Status: DISPENSED
Start: 2021-03-31

## (undated) RX ORDER — PROPOFOL 10 MG/ML
INJECTION, EMULSION INTRAVENOUS
Status: DISPENSED
Start: 2021-03-31

## (undated) RX ORDER — FENTANYL CITRATE-0.9 % NACL/PF 10 MCG/ML
PLASTIC BAG, INJECTION (ML) INTRAVENOUS
Status: DISPENSED
Start: 2021-04-12

## (undated) RX ORDER — ONDANSETRON 2 MG/ML
INJECTION INTRAMUSCULAR; INTRAVENOUS
Status: DISPENSED
Start: 2021-03-31

## (undated) RX ORDER — LEVOFLOXACIN 5 MG/ML
INJECTION, SOLUTION INTRAVENOUS
Status: DISPENSED
Start: 2021-03-31

## (undated) RX ORDER — INDOMETHACIN 50 MG/1
SUPPOSITORY RECTAL
Status: DISPENSED
Start: 2021-03-31

## (undated) RX ORDER — ALBUMIN, HUMAN INJ 5% 5 %
SOLUTION INTRAVENOUS
Status: DISPENSED
Start: 2021-03-31

## (undated) RX ORDER — EPINEPHRINE 1 MG/ML
INJECTION, SOLUTION INTRAMUSCULAR; SUBCUTANEOUS
Status: DISPENSED
Start: 2021-03-31

## (undated) RX ORDER — FENTANYL CITRATE 50 UG/ML
INJECTION, SOLUTION INTRAMUSCULAR; INTRAVENOUS
Status: DISPENSED
Start: 2021-04-12

## (undated) RX ORDER — ONDANSETRON 2 MG/ML
INJECTION INTRAMUSCULAR; INTRAVENOUS
Status: DISPENSED
Start: 2021-04-12

## (undated) RX ORDER — FENTANYL CITRATE 50 UG/ML
INJECTION, SOLUTION INTRAMUSCULAR; INTRAVENOUS
Status: DISPENSED
Start: 2021-03-31

## (undated) RX ORDER — SIMETHICONE 40MG/0.6ML
SUSPENSION, DROPS(FINAL DOSAGE FORM)(ML) ORAL
Status: DISPENSED
Start: 2021-03-31